# Patient Record
Sex: MALE | Race: WHITE | Employment: OTHER | URBAN - METROPOLITAN AREA
[De-identification: names, ages, dates, MRNs, and addresses within clinical notes are randomized per-mention and may not be internally consistent; named-entity substitution may affect disease eponyms.]

---

## 2020-09-27 ENCOUNTER — APPOINTMENT (OUTPATIENT)
Dept: GENERAL RADIOLOGY | Age: 70
End: 2020-09-27
Payer: MEDICARE

## 2020-09-27 ENCOUNTER — HOSPITAL ENCOUNTER (EMERGENCY)
Age: 70
Discharge: HOME | End: 2020-09-27
Attending: EMERGENCY MEDICINE
Payer: MEDICARE

## 2020-09-27 VITALS
DIASTOLIC BLOOD PRESSURE: 89 MMHG | HEART RATE: 86 BPM | OXYGEN SATURATION: 98 % | WEIGHT: 145 LBS | BODY MASS INDEX: 21.98 KG/M2 | TEMPERATURE: 97.8 F | RESPIRATION RATE: 16 BRPM | HEIGHT: 68 IN | SYSTOLIC BLOOD PRESSURE: 125 MMHG

## 2020-09-27 PROCEDURE — 99284 EMERGENCY DEPT VISIT MOD MDM: CPT

## 2020-09-27 PROCEDURE — 99283 EMERGENCY DEPT VISIT LOW MDM: CPT

## 2020-09-27 PROCEDURE — 6370000000 HC RX 637 (ALT 250 FOR IP): Performed by: PHYSICIAN ASSISTANT

## 2020-09-27 PROCEDURE — 71101 X-RAY EXAM UNILAT RIBS/CHEST: CPT

## 2020-09-27 PROCEDURE — 73090 X-RAY EXAM OF FOREARM: CPT

## 2020-09-27 RX ORDER — IBUPROFEN 400 MG/1
600 TABLET ORAL ONCE
Status: COMPLETED | OUTPATIENT
Start: 2020-09-27 | End: 2020-09-27

## 2020-09-27 RX ORDER — DIAPER,BRIEF,INFANT-TODD,DISP
EACH MISCELLANEOUS ONCE
Status: COMPLETED | OUTPATIENT
Start: 2020-09-27 | End: 2020-09-27

## 2020-09-27 RX ADMIN — IBUPROFEN 600 MG: 400 TABLET, FILM COATED ORAL at 20:14

## 2020-09-27 RX ADMIN — BACITRACIN ZINC: 500 OINTMENT TOPICAL at 20:35

## 2020-09-27 NOTE — ED PROVIDER NOTES
forearm. Tenderness:  mild. Swelling: None. Deformity: No.                 ROM: full range of motion. Skin:  no erythema, rash or wounds noted. Neurovascular: Motor deficit: none. Sensory deficit: none. Pulse deficit: none. Capillary refill: normal.  · Lymphatics: No lymphangitis or adenopathy noted. · Neurological:  Oriented. Motor functions intact. Lab / Imaging Results   (All laboratory and radiology results have been personally reviewed by myself)  Labs:  No results found for this visit on 09/27/20. Imaging: All Radiology results interpreted by Radiologist unless otherwise noted. XR RIBS RIGHT INCLUDE CHEST (MIN 3 VIEWS)   Final Result      No evidence for rib fracture or pneumothorax. XR RADIUS ULNA LEFT (2 VIEWS)   Final Result      No acute fracture is identified. ED Course / Medical Decision Making     Medications   ibuprofen (ADVIL;MOTRIN) tablet 600 mg (600 mg Oral Given 9/27/20 2014)   bacitracin zinc ointment ( Topical Given 9/27/20 2035)        Consult:   None    Procedure(s):   none    MDM:   Patient presenting after falling through his porch. Patient is in no acute distress, afebrile, nontoxic in appearance. Patient's x-rays are normal.  Patient feeling slightly better after Motrin. Discussed supportive care with patient. Recommended patient follow-up with PCP. Recommended patient return to the ED with new or worsening of symptoms. Counseling: The emergency provider has spoken with the patient and discussed todays results, in addition to providing specific details for the plan of care and counseling regarding the diagnosis and prognosis. Questions are answered at this time and they are agreeable with the plan. Assessment      1. Rib contusion, right, initial encounter    2. Abrasion of left forearm, initial encounter    3. Abrasion of left chest wall, initial encounter      Plan   Discharge to home  Patient condition is stable    New Medications     New Prescriptions    No medications on file     Electronically signed by Diana Perez PA-C   DD: 9/27/20  **This report was transcribed using voice recognition software. Every effort was made to ensure accuracy; however, inadvertent computerized transcription errors may be present.   END OF ED PROVIDER NOTE     Diana Perez PA-C  09/27/20 1885

## 2021-03-13 ENCOUNTER — APPOINTMENT (OUTPATIENT)
Dept: GENERAL RADIOLOGY | Age: 71
End: 2021-03-13
Payer: MEDICARE

## 2021-03-13 ENCOUNTER — APPOINTMENT (OUTPATIENT)
Dept: CT IMAGING | Age: 71
End: 2021-03-13
Payer: MEDICARE

## 2021-03-13 ENCOUNTER — HOSPITAL ENCOUNTER (EMERGENCY)
Age: 71
Discharge: ANOTHER HOSPITAL | End: 2021-03-14
Attending: FAMILY MEDICINE
Payer: MEDICARE

## 2021-03-13 VITALS
OXYGEN SATURATION: 93 % | SYSTOLIC BLOOD PRESSURE: 119 MMHG | DIASTOLIC BLOOD PRESSURE: 86 MMHG | RESPIRATION RATE: 24 BRPM | BODY MASS INDEX: 24.02 KG/M2 | HEART RATE: 91 BPM | TEMPERATURE: 100.5 F | WEIGHT: 158 LBS

## 2021-03-13 DIAGNOSIS — R53.1 GENERALIZED WEAKNESS: ICD-10-CM

## 2021-03-13 DIAGNOSIS — Z87.19 HISTORY OF CIRRHOSIS OF LIVER: ICD-10-CM

## 2021-03-13 DIAGNOSIS — R41.82 ALTERED MENTAL STATUS, UNSPECIFIED ALTERED MENTAL STATUS TYPE: Primary | ICD-10-CM

## 2021-03-13 DIAGNOSIS — R19.5 GUAIAC POSITIVE STOOLS: ICD-10-CM

## 2021-03-13 LAB
ALBUMIN SERPL-MCNC: 3.2 G/DL (ref 3.5–5.2)
ALP BLD-CCNC: 102 U/L (ref 40–129)
ALT SERPL-CCNC: 16 U/L (ref 0–40)
AMMONIA: 30 UMOL/L (ref 16–60)
AMYLASE: 36 U/L (ref 20–100)
ANION GAP SERPL CALCULATED.3IONS-SCNC: 10 MMOL/L (ref 7–16)
ANISOCYTOSIS: ABNORMAL
AST SERPL-CCNC: 42 U/L (ref 0–39)
BASOPHILS ABSOLUTE: 0.05 E9/L (ref 0–0.2)
BASOPHILS RELATIVE PERCENT: 0.5 % (ref 0–2)
BILIRUB SERPL-MCNC: 1.9 MG/DL (ref 0–1.2)
BUN BLDV-MCNC: 21 MG/DL (ref 8–23)
CALCIUM SERPL-MCNC: 9.1 MG/DL (ref 8.6–10.2)
CHLORIDE BLD-SCNC: 115 MMOL/L (ref 98–107)
CO2: 21 MMOL/L (ref 22–29)
CREAT SERPL-MCNC: 0.9 MG/DL (ref 0.7–1.2)
EOSINOPHILS ABSOLUTE: 0.37 E9/L (ref 0.05–0.5)
EOSINOPHILS RELATIVE PERCENT: 4 % (ref 0–6)
GFR AFRICAN AMERICAN: >60
GFR NON-AFRICAN AMERICAN: >60 ML/MIN/1.73
GLUCOSE BLD-MCNC: 111 MG/DL (ref 74–99)
HCT VFR BLD CALC: 25.6 % (ref 37–54)
HEMOGLOBIN: 7.6 G/DL (ref 12.5–16.5)
HYPOCHROMIA: ABNORMAL
IMMATURE GRANULOCYTES #: 0.05 E9/L
IMMATURE GRANULOCYTES %: 0.5 % (ref 0–5)
LACTIC ACID: 2.1 MMOL/L (ref 0.5–2.2)
LIPASE: 41 U/L (ref 13–60)
LYMPHOCYTES ABSOLUTE: 1.36 E9/L (ref 1.5–4)
LYMPHOCYTES RELATIVE PERCENT: 14.7 % (ref 20–42)
MCH RBC QN AUTO: 29.2 PG (ref 26–35)
MCHC RBC AUTO-ENTMCNC: 29.7 % (ref 32–34.5)
MCV RBC AUTO: 98.5 FL (ref 80–99.9)
METER GLUCOSE: 102 MG/DL (ref 74–99)
MONOCYTES ABSOLUTE: 0.98 E9/L (ref 0.1–0.95)
MONOCYTES RELATIVE PERCENT: 10.6 % (ref 2–12)
NEUTROPHILS ABSOLUTE: 6.47 E9/L (ref 1.8–7.3)
NEUTROPHILS RELATIVE PERCENT: 69.7 % (ref 43–80)
PDW BLD-RTO: 20.8 FL (ref 11.5–15)
PLATELET # BLD: 107 E9/L (ref 130–450)
PMV BLD AUTO: 13 FL (ref 7–12)
POLYCHROMASIA: ABNORMAL
POTASSIUM REFLEX MAGNESIUM: 4.1 MMOL/L (ref 3.5–5)
RBC # BLD: 2.6 E12/L (ref 3.8–5.8)
SODIUM BLD-SCNC: 146 MMOL/L (ref 132–146)
TOTAL PROTEIN: 7.1 G/DL (ref 6.4–8.3)
WBC # BLD: 9.3 E9/L (ref 4.5–11.5)

## 2021-03-13 PROCEDURE — 85025 COMPLETE CBC W/AUTO DIFF WBC: CPT

## 2021-03-13 PROCEDURE — U0003 INFECTIOUS AGENT DETECTION BY NUCLEIC ACID (DNA OR RNA); SEVERE ACUTE RESPIRATORY SYNDROME CORONAVIRUS 2 (SARS-COV-2) (CORONAVIRUS DISEASE [COVID-19]), AMPLIFIED PROBE TECHNIQUE, MAKING USE OF HIGH THROUGHPUT TECHNOLOGIES AS DESCRIBED BY CMS-2020-01-R: HCPCS

## 2021-03-13 PROCEDURE — 83690 ASSAY OF LIPASE: CPT

## 2021-03-13 PROCEDURE — 82150 ASSAY OF AMYLASE: CPT

## 2021-03-13 PROCEDURE — 36415 COLL VENOUS BLD VENIPUNCTURE: CPT

## 2021-03-13 PROCEDURE — 99285 EMERGENCY DEPT VISIT HI MDM: CPT

## 2021-03-13 PROCEDURE — 83605 ASSAY OF LACTIC ACID: CPT

## 2021-03-13 PROCEDURE — 71045 X-RAY EXAM CHEST 1 VIEW: CPT

## 2021-03-13 PROCEDURE — 80053 COMPREHEN METABOLIC PANEL: CPT

## 2021-03-13 PROCEDURE — 82140 ASSAY OF AMMONIA: CPT

## 2021-03-13 PROCEDURE — 6360000004 HC RX CONTRAST MEDICATION: Performed by: RADIOLOGY

## 2021-03-13 PROCEDURE — 70450 CT HEAD/BRAIN W/O DYE: CPT

## 2021-03-13 PROCEDURE — 82962 GLUCOSE BLOOD TEST: CPT

## 2021-03-13 PROCEDURE — 93005 ELECTROCARDIOGRAM TRACING: CPT | Performed by: FAMILY MEDICINE

## 2021-03-13 PROCEDURE — 74177 CT ABD & PELVIS W/CONTRAST: CPT

## 2021-03-13 RX ORDER — DOCUSATE SODIUM 100 MG/1
100 CAPSULE, LIQUID FILLED ORAL 2 TIMES DAILY
COMMUNITY
End: 2021-03-13

## 2021-03-13 RX ORDER — PANTOPRAZOLE SODIUM 40 MG/1
40 TABLET, DELAYED RELEASE ORAL 2 TIMES DAILY
COMMUNITY
End: 2021-03-13

## 2021-03-13 RX ORDER — CIMETIDINE 400 MG/1
400 TABLET, FILM COATED ORAL 2 TIMES DAILY
COMMUNITY
End: 2021-03-13

## 2021-03-13 RX ORDER — DOXEPIN HYDROCHLORIDE 10 MG/1
10 CAPSULE ORAL NIGHTLY
COMMUNITY

## 2021-03-13 RX ORDER — POLYETHYLENE GLYCOL 3350 17 G/17G
17 POWDER, FOR SOLUTION ORAL DAILY
COMMUNITY
End: 2021-03-13

## 2021-03-13 RX ORDER — FUROSEMIDE 40 MG/1
40 TABLET ORAL DAILY
COMMUNITY
End: 2021-03-13

## 2021-03-13 RX ORDER — OMEPRAZOLE 40 MG/1
40 CAPSULE, DELAYED RELEASE ORAL DAILY
COMMUNITY

## 2021-03-13 RX ORDER — NADOLOL 20 MG/1
20 TABLET ORAL DAILY
Status: ON HOLD | COMMUNITY
End: 2021-09-16 | Stop reason: HOSPADM

## 2021-03-13 RX ORDER — SPIRONOLACTONE 50 MG/1
50 TABLET, FILM COATED ORAL 2 TIMES DAILY
COMMUNITY
End: 2021-03-13

## 2021-03-13 RX ORDER — PREDNISONE 10 MG/1
10 TABLET ORAL DAILY
Status: ON HOLD | COMMUNITY
End: 2021-09-16 | Stop reason: HOSPADM

## 2021-03-13 RX ORDER — AMITRIPTYLINE HYDROCHLORIDE 25 MG/1
25 TABLET, FILM COATED ORAL NIGHTLY
COMMUNITY
End: 2021-03-13

## 2021-03-13 RX ADMIN — IOPAMIDOL 75 ML: 755 INJECTION, SOLUTION INTRAVENOUS at 21:03

## 2021-03-13 NOTE — ED PROVIDER NOTES
HPI:  3/13/21, Time: 6:36 PM TOY Temple is a 69 y.o. male presenting to the ED for a 2-day history of worsening symptoms at that include a weakness of the upper and lower extremities, he cannot get out of bed or walk on his own, he has to be assisted by his wife to get to the bathroom or get into a chair. He also has 2 weeks of loose stools and dark tarry black stools. He has not had any food intake other than drinking liquids over the past 2 days. Also for the past 2 days he is with speech has been slurred. This information was gathered from his wife. She states that the patient has a generalized weakness, not any particular one side of his body. Due to this weakness, he has had several falls over the past 2 days. He understands questions and follows commands but his speech has not been intelligible with his kind of frustrating for him. He has a history of liver cirrhosis was hospitalized 7 months ago and then shortly after that was hospitalized for bleeding ulcer. She states that he when he was hospitalized for cirrhosis, his ammonia level was elevated. He currently does not take any medication to keep his ammonia level regulated. He has a history of alcohol abuse. He has a primary care physician is in the [facility] area and he was last hospitalized at [facility].  He has no medical history and the [facility] system. ROS:   Pertinent positives and negatives are stated within HPI, all other systems reviewed and are negative.  --------------------------------------------- PAST HISTORY ---------------------------------------------  Past Medical History:  has a past medical history of Anemia, Ascites, and Encounter for blood transfusion. Past Surgical History:  has no past surgical history on file. Social History:      Family History: family history is not on file. The patients home medications have been reviewed.     Allergies: Penicillins    ---------------------------------------------------PHYSICAL EXAM--------------------------------------  Constitutional/General: Alert and oriented x3, well appearing, non toxic in NAD  Head: Normocephalic and atraumatic  Eyes: PERRL, EOMI  Mouth: Oropharynx clear, handling secretions, no trismus  Neck: Supple, full ROM, non tender to palpation in the midline, no stridor, no crepitus, no meningeal signs  Pulmonary: Lungs clear to auscultation bilaterally, no wheezes, rales, or rhonchi. Not in respiratory distress  Cardiovascular:  Regular rate. Regular rhythm. No murmurs, gallops, or rubs. 2+ distal pulses  Chest: no chest wall tenderness  Abdomen: Soft. Non tender. Mild distended, bowel sounds hypoactive in all 4 quadrants. There was no rebound tenderness. No hernias or masses organomegaly were palpated. A stool sample was obtained from a bowel movement and it was guaiac positive. Musculoskeletal: Moves all extremities x 4. Warm and well perfused, no clubbing, cyanosis, or edema. Capillary refill <3 seconds  Skin: warm and dry. No rashes. Neurologic: GCS 15, CN 2-12 grossly intact, no focal deficits, symmetric strength 5/5 in the upper and lower extremities bilaterally  Psych: Normal Affect    -------------------------------------------------- RESULTS -------------------------------------------------  I have personally reviewed all laboratory and imaging results for this patient. Results are listed below.      LABS:  Results for orders placed or performed during the hospital encounter of 03/13/21   CBC Auto Differential   Result Value Ref Range    WBC 9.3 4.5 - 11.5 E9/L    RBC 2.60 (L) 3.80 - 5.80 E12/L    Hemoglobin 7.6 (L) 12.5 - 16.5 g/dL    Hematocrit 25.6 (L) 37.0 - 54.0 %    MCV 98.5 80.0 - 99.9 fL    MCH 29.2 26.0 - 35.0 pg    MCHC 29.7 (L) 32.0 - 34.5 %    RDW 20.8 (H) 11.5 - 15.0 fL    Platelets 344 (L) 884 - 450 E9/L    MPV 13.0 (H) 7.0 - 12.0 fL    Neutrophils % 69.7 43.0 - 80.0 % Immature Granulocytes % 0.5 0.0 - 5.0 %    Lymphocytes % 14.7 (L) 20.0 - 42.0 %    Monocytes % 10.6 2.0 - 12.0 %    Eosinophils % 4.0 0.0 - 6.0 %    Basophils % 0.5 0.0 - 2.0 %    Neutrophils Absolute 6.47 1.80 - 7.30 E9/L    Immature Granulocytes # 0.05 E9/L    Lymphocytes Absolute 1.36 (L) 1.50 - 4.00 E9/L    Monocytes Absolute 0.98 (H) 0.10 - 0.95 E9/L    Eosinophils Absolute 0.37 0.05 - 0.50 E9/L    Basophils Absolute 0.05 0.00 - 0.20 E9/L    Anisocytosis 2+     Polychromasia 1+     Hypochromia 1+    Comprehensive Metabolic Panel w/ Reflex to MG   Result Value Ref Range    Sodium 146 132 - 146 mmol/L    Potassium reflex Magnesium 4.1 3.5 - 5.0 mmol/L    Chloride 115 (H) 98 - 107 mmol/L    CO2 21 (L) 22 - 29 mmol/L    Anion Gap 10 7 - 16 mmol/L    Glucose 111 (H) 74 - 99 mg/dL    BUN 21 8 - 23 mg/dL    CREATININE 0.9 0.7 - 1.2 mg/dL    GFR Non-African American >60 >=60 mL/min/1.73    GFR African American >60     Calcium 9.1 8.6 - 10.2 mg/dL    Total Protein 7.1 6.4 - 8.3 g/dL    Albumin 3.2 (L) 3.5 - 5.2 g/dL    Total Bilirubin 1.9 (H) 0.0 - 1.2 mg/dL    Alkaline Phosphatase 102 40 - 129 U/L    ALT 16 0 - 40 U/L    AST 42 (H) 0 - 39 U/L   Lipase   Result Value Ref Range    Lipase 41 13 - 60 U/L   Amylase   Result Value Ref Range    Amylase 36 20 - 100 U/L   Lactic Acid, Plasma   Result Value Ref Range    Lactic Acid 2.1 0.5 - 2.2 mmol/L   Ammonia   Result Value Ref Range    Ammonia 30.0 16.0 - 60.0 umol/L   POCT Glucose   Result Value Ref Range    Meter Glucose 102 (H) 74 - 99 mg/dL       RADIOLOGY:  Interpreted by Radiologist.  CT ABDOMEN PELVIS W IV CONTRAST Additional Contrast? None   Final Result   1. Heterogeneous appearance of the liver suggestive of chronic   hepatocellular disease. Clinical correlation recommended. 2.  Small perihepatic ascites. 3.  Diverticulosis. 4.  Numerous tiny gallstones or hyperattenuating sludge layers in the   gallbladder.       5.  Cystic lesion associated with right kidney likely represents a renal   cyst.  Ultrasound could be helpful for further characterization. XR CHEST PORTABLE   Final Result   No acute process. Mild cardiomegaly. CT Head WO Contrast   Final Result   No acute intracranial abnormality. EKG Interpretation  Interpreted by emergency department physician    Rhythm: sinus tachycardia  Rate: 111  Axis: left  Conduction:   ST Segments: nonspecific changes  T Waves: no acute change    Clinical Impression: Sinus tachycardia with PVCs  Comparison to prior EKG: no previous EKG and None      ------------------------- NURSING NOTES AND VITALS REVIEWED ---------------------------   The nursing notes within the ED encounter and vital signs as below have been reviewed by myself. BP (!) 140/68   Pulse 97   Temp 100.5 °F (38.1 °C)   Resp 30   Wt 158 lb (71.7 kg)   SpO2 93%   BMI 24.02 kg/m²   Oxygen Saturation Interpretation: Normal    The patients available past medical records and past encounters were reviewed. ------------------------------ ED COURSE/MEDICAL DECISION MAKING----------------------  Medications   iopamidol (ISOVUE-370) 76 % injection 75 mL (75 mLs Intravenous Given 3/13/21 2103)             Medical Decision Making:    Patient's work-up included a CT scan of the head that was negative for acute findings. His chest x-ray showed no acute findings. Laboratory values showed a normal white blood cell count, H&H was normal and electrolytes were normal except for an elevated chloride. BUN and creatinine were normal.  LFTs showed an elevated total bilirubin of 1.9, albumin low at 3.2 and AST was elevated at 42. An ammonia level was pending at the time of dictation. This is a laboratory value that is sent out to the main lab at [facility]. Consultations:              At 9:04 PM, I discussed the patient with the transfer line at [facility].  They accepted the patient for admission. Counseling: The emergency provider has spoken with the patient and discussed todays results, in addition to providing specific details for the plan of care and counseling regarding the diagnosis and prognosis. Questions are answered at this time and they are agreeable with the plan.       --------------------------------- IMPRESSION AND DISPOSITION ---------------------------------    IMPRESSION  1. Altered mental status, unspecified altered mental status type    2. Generalized weakness    3. History of cirrhosis of liver    4. Guaiac positive stools        DISPOSITION  Disposition: Transfer to [facility]  Patient condition is fair        NOTE: This report was transcribed using voice recognition software.  Every effort was made to ensure accuracy; however, inadvertent computerized transcription errors may be present         Juliana Larsen MD  03/13/21 2950

## 2021-03-13 NOTE — ED NOTES
Bed: 11  Expected date:   Expected time:   Means of arrival:   Comments:  Safia Rodas, RN  03/13/21 6322

## 2021-03-13 NOTE — ED NOTES
GRIS Meier doesn't feel it is safe to do swallow screen at this time     Mihai Leonard RN  03/13/21 6405

## 2021-03-14 LAB
EKG ATRIAL RATE: 111 BPM
EKG P AXIS: 1 DEGREES
EKG P-R INTERVAL: 144 MS
EKG Q-T INTERVAL: 354 MS
EKG QRS DURATION: 100 MS
EKG QTC CALCULATION (BAZETT): 481 MS
EKG R AXIS: -35 DEGREES
EKG T AXIS: 65 DEGREES
EKG VENTRICULAR RATE: 111 BPM

## 2021-03-14 PROCEDURE — 93010 ELECTROCARDIOGRAM REPORT: CPT | Performed by: INTERNAL MEDICINE

## 2021-03-14 NOTE — ED NOTES
Report to LTAC, located within [facility] at [facility], RN  03/14/21 4296

## 2021-03-14 NOTE — ED NOTES
Clean linen and gown/diaper.   Washed pt's perineal area  Guaiac positive on stool sample     Abril Levine RN  03/13/21 6436

## 2021-03-15 LAB — SARS-COV-2, PCR: NOT DETECTED

## 2021-09-14 ENCOUNTER — ANESTHESIA (OUTPATIENT)
Dept: ENDOSCOPY | Age: 71
DRG: 432 | End: 2021-09-14
Payer: MEDICARE

## 2021-09-14 ENCOUNTER — ANESTHESIA EVENT (OUTPATIENT)
Dept: ENDOSCOPY | Age: 71
DRG: 432 | End: 2021-09-14
Payer: MEDICARE

## 2021-09-14 ENCOUNTER — APPOINTMENT (OUTPATIENT)
Dept: GENERAL RADIOLOGY | Age: 71
DRG: 432 | End: 2021-09-14
Payer: MEDICARE

## 2021-09-14 ENCOUNTER — APPOINTMENT (OUTPATIENT)
Dept: CT IMAGING | Age: 71
DRG: 432 | End: 2021-09-14
Payer: MEDICARE

## 2021-09-14 ENCOUNTER — HOSPITAL ENCOUNTER (INPATIENT)
Age: 71
LOS: 2 days | Discharge: HOME | DRG: 432 | End: 2021-09-16
Attending: EMERGENCY MEDICINE | Admitting: FAMILY MEDICINE
Payer: MEDICARE

## 2021-09-14 VITALS — DIASTOLIC BLOOD PRESSURE: 61 MMHG | SYSTOLIC BLOOD PRESSURE: 127 MMHG | OXYGEN SATURATION: 100 %

## 2021-09-14 DIAGNOSIS — K74.60 HEPATIC CIRRHOSIS, UNSPECIFIED HEPATIC CIRRHOSIS TYPE, UNSPECIFIED WHETHER ASCITES PRESENT (HCC): ICD-10-CM

## 2021-09-14 DIAGNOSIS — K92.2 GASTROINTESTINAL HEMORRHAGE, UNSPECIFIED GASTROINTESTINAL HEMORRHAGE TYPE: Primary | ICD-10-CM

## 2021-09-14 LAB
ABO/RH: NORMAL
ACETAMINOPHEN LEVEL: <5 MCG/ML (ref 10–30)
ALBUMIN SERPL-MCNC: 2.2 G/DL (ref 3.5–5.2)
ALP BLD-CCNC: 127 U/L (ref 40–129)
ALT SERPL-CCNC: 15 U/L (ref 0–40)
AMMONIA: 29 UMOL/L (ref 16–60)
ANGLE (CLOT STRENGTH): 76.5 DEGREE (ref 59–74)
ANION GAP SERPL CALCULATED.3IONS-SCNC: 13 MMOL/L (ref 7–16)
ANISOCYTOSIS: ABNORMAL
ANTIBODY SCREEN: NORMAL
AST SERPL-CCNC: 41 U/L (ref 0–39)
BASOPHILS ABSOLUTE: 0.15 E9/L (ref 0–0.2)
BASOPHILS RELATIVE PERCENT: 0.9 % (ref 0–2)
BILIRUB SERPL-MCNC: 1 MG/DL (ref 0–1.2)
BILIRUBIN URINE: NEGATIVE
BLOOD BANK DISPENSE STATUS: NORMAL
BLOOD BANK PRODUCT CODE: NORMAL
BLOOD, URINE: NEGATIVE
BPU ID: NORMAL
BUN BLDV-MCNC: 27 MG/DL (ref 6–23)
BURR CELLS: ABNORMAL
CALCIUM IONIZED: 1.05 MMOL/L (ref 1.15–1.33)
CALCIUM SERPL-MCNC: 7.6 MG/DL (ref 8.6–10.2)
CHLORIDE BLD-SCNC: 96 MMOL/L (ref 98–107)
CLARITY: CLEAR
CO2: 19 MMOL/L (ref 22–29)
COLOR: YELLOW
CREAT SERPL-MCNC: 0.9 MG/DL (ref 0.7–1.2)
DESCRIPTION BLOOD BANK: NORMAL
EKG ATRIAL RATE: 126 BPM
EKG P AXIS: 67 DEGREES
EKG P-R INTERVAL: 134 MS
EKG Q-T INTERVAL: 348 MS
EKG QRS DURATION: 98 MS
EKG QTC CALCULATION (BAZETT): 504 MS
EKG R AXIS: -31 DEGREES
EKG T AXIS: 24 DEGREES
EKG VENTRICULAR RATE: 126 BPM
EOSINOPHILS ABSOLUTE: 0 E9/L (ref 0.05–0.5)
EOSINOPHILS RELATIVE PERCENT: 1.2 % (ref 0–6)
EPL-TEG: 0 % (ref 0–15)
ETHANOL: <10 MG/DL (ref 0–0.08)
G-TEG: 12.4 K D/SC (ref 4.5–11)
GFR AFRICAN AMERICAN: >60
GFR NON-AFRICAN AMERICAN: >60 ML/MIN/1.73
GLUCOSE BLD-MCNC: 118 MG/DL (ref 74–99)
GLUCOSE URINE: NEGATIVE MG/DL
HCT VFR BLD CALC: 19.8 % (ref 37–54)
HCT VFR BLD CALC: 22.3 % (ref 37–54)
HCT VFR BLD CALC: 22.7 % (ref 37–54)
HEMOGLOBIN: 5.4 G/DL (ref 12.5–16.5)
HEMOGLOBIN: 6.9 G/DL (ref 12.5–16.5)
HEMOGLOBIN: 7 G/DL (ref 12.5–16.5)
HYPOCHROMIA: ABNORMAL
INR BLD: 2.3
K (CLOTTING TIME): 0.8 MIN (ref 1–3)
KETONES, URINE: NEGATIVE MG/DL
LACTIC ACID: 1.6 MMOL/L (ref 0.5–2.2)
LACTIC ACID: 4.8 MMOL/L (ref 0.5–2.2)
LEUKOCYTE ESTERASE, URINE: NEGATIVE
LIPASE: 38 U/L (ref 13–60)
LY30 (FIBRINOLYSIS): 0 % (ref 0–8)
LYMPHOCYTES ABSOLUTE: 0.66 E9/L (ref 1.5–4)
LYMPHOCYTES RELATIVE PERCENT: 3.5 % (ref 20–42)
MA (MAX AMPLITUDE): 71.3 MM (ref 50–70)
MAGNESIUM: 1.5 MG/DL (ref 1.6–2.6)
MCH RBC QN AUTO: 21.6 PG (ref 26–35)
MCHC RBC AUTO-ENTMCNC: 27.3 % (ref 32–34.5)
MCV RBC AUTO: 79.2 FL (ref 80–99.9)
MONOCYTES ABSOLUTE: 0.66 E9/L (ref 0.1–0.95)
MONOCYTES RELATIVE PERCENT: 4.3 % (ref 2–12)
NEUTROPHILS ABSOLUTE: 15.02 E9/L (ref 1.8–7.3)
NEUTROPHILS RELATIVE PERCENT: 91.3 % (ref 43–80)
NITRITE, URINE: NEGATIVE
OVALOCYTES: ABNORMAL
PDW BLD-RTO: 20.4 FL (ref 11.5–15)
PH UA: 6 (ref 5–9)
PHOSPHORUS: 2.6 MG/DL (ref 2.5–4.5)
PLATELET # BLD: 303 E9/L (ref 130–450)
PMV BLD AUTO: 11.1 FL (ref 7–12)
POIKILOCYTES: ABNORMAL
POLYCHROMASIA: ABNORMAL
POTASSIUM SERPL-SCNC: 3.4 MMOL/L (ref 3.5–5)
PROTEIN UA: NEGATIVE MG/DL
PROTHROMBIN TIME: 25.5 SEC (ref 9.3–12.4)
R (REACTION TIME): 2 MIN (ref 5–10)
RBC # BLD: 2.5 E12/L (ref 3.8–5.8)
SALICYLATE, SERUM: 1.4 MG/DL (ref 0–30)
SARS-COV-2, NAAT: NOT DETECTED
SODIUM BLD-SCNC: 128 MMOL/L (ref 132–146)
SPECIFIC GRAVITY UA: 1.01 (ref 1–1.03)
TARGET CELLS: ABNORMAL
TOTAL PROTEIN: 5.3 G/DL (ref 6.4–8.3)
TRICYCLIC ANTIDEPRESSANTS SCREEN SERUM: NEGATIVE NG/ML
TROPONIN, HIGH SENSITIVITY: 18 NG/L (ref 0–11)
UROBILINOGEN, URINE: 0.2 E.U./DL
WBC # BLD: 16.5 E9/L (ref 4.5–11.5)

## 2021-09-14 PROCEDURE — 86900 BLOOD TYPING SEROLOGIC ABO: CPT

## 2021-09-14 PROCEDURE — 81003 URINALYSIS AUTO W/O SCOPE: CPT

## 2021-09-14 PROCEDURE — 83690 ASSAY OF LIPASE: CPT

## 2021-09-14 PROCEDURE — 85576 BLOOD PLATELET AGGREGATION: CPT

## 2021-09-14 PROCEDURE — 72125 CT NECK SPINE W/O DYE: CPT

## 2021-09-14 PROCEDURE — 6360000002 HC RX W HCPCS: Performed by: INTERNAL MEDICINE

## 2021-09-14 PROCEDURE — 70450 CT HEAD/BRAIN W/O DYE: CPT

## 2021-09-14 PROCEDURE — 2580000003 HC RX 258: Performed by: STUDENT IN AN ORGANIZED HEALTH CARE EDUCATION/TRAINING PROGRAM

## 2021-09-14 PROCEDURE — 85025 COMPLETE CBC W/AUTO DIFF WBC: CPT

## 2021-09-14 PROCEDURE — 36430 TRANSFUSION BLD/BLD COMPNT: CPT

## 2021-09-14 PROCEDURE — C9113 INJ PANTOPRAZOLE SODIUM, VIA: HCPCS | Performed by: STUDENT IN AN ORGANIZED HEALTH CARE EDUCATION/TRAINING PROGRAM

## 2021-09-14 PROCEDURE — 2000000000 HC ICU R&B

## 2021-09-14 PROCEDURE — 85347 COAGULATION TIME ACTIVATED: CPT

## 2021-09-14 PROCEDURE — P9016 RBC LEUKOCYTES REDUCED: HCPCS

## 2021-09-14 PROCEDURE — 6360000002 HC RX W HCPCS: Performed by: FAMILY MEDICINE

## 2021-09-14 PROCEDURE — 99285 EMERGENCY DEPT VISIT HI MDM: CPT

## 2021-09-14 PROCEDURE — 3700000000 HC ANESTHESIA ATTENDED CARE: Performed by: INTERNAL MEDICINE

## 2021-09-14 PROCEDURE — 36556 INSERT NON-TUNNEL CV CATH: CPT

## 2021-09-14 PROCEDURE — 85018 HEMOGLOBIN: CPT

## 2021-09-14 PROCEDURE — 2709999900 HC NON-CHARGEABLE SUPPLY: Performed by: INTERNAL MEDICINE

## 2021-09-14 PROCEDURE — 82077 ASSAY SPEC XCP UR&BREATH IA: CPT

## 2021-09-14 PROCEDURE — 6360000002 HC RX W HCPCS: Performed by: STUDENT IN AN ORGANIZED HEALTH CARE EDUCATION/TRAINING PROGRAM

## 2021-09-14 PROCEDURE — 80048 BASIC METABOLIC PNL TOTAL CA: CPT

## 2021-09-14 PROCEDURE — 2580000003 HC RX 258: Performed by: FAMILY MEDICINE

## 2021-09-14 PROCEDURE — 6360000002 HC RX W HCPCS: Performed by: EMERGENCY MEDICINE

## 2021-09-14 PROCEDURE — 80143 DRUG ASSAY ACETAMINOPHEN: CPT

## 2021-09-14 PROCEDURE — 85384 FIBRINOGEN ACTIVITY: CPT

## 2021-09-14 PROCEDURE — 74176 CT ABD & PELVIS W/O CONTRAST: CPT

## 2021-09-14 PROCEDURE — 71045 X-RAY EXAM CHEST 1 VIEW: CPT

## 2021-09-14 PROCEDURE — 2500000003 HC RX 250 WO HCPCS: Performed by: NURSE ANESTHETIST, CERTIFIED REGISTERED

## 2021-09-14 PROCEDURE — 80307 DRUG TEST PRSMV CHEM ANLYZR: CPT

## 2021-09-14 PROCEDURE — 02HV33Z INSERTION OF INFUSION DEVICE INTO SUPERIOR VENA CAVA, PERCUTANEOUS APPROACH: ICD-10-PCS | Performed by: STUDENT IN AN ORGANIZED HEALTH CARE EDUCATION/TRAINING PROGRAM

## 2021-09-14 PROCEDURE — 3609013000 HC EGD TRANSORAL CONTROL BLEEDING ANY METHOD: Performed by: INTERNAL MEDICINE

## 2021-09-14 PROCEDURE — 93005 ELECTROCARDIOGRAM TRACING: CPT | Performed by: EMERGENCY MEDICINE

## 2021-09-14 PROCEDURE — 86850 RBC ANTIBODY SCREEN: CPT

## 2021-09-14 PROCEDURE — 83605 ASSAY OF LACTIC ACID: CPT

## 2021-09-14 PROCEDURE — 83735 ASSAY OF MAGNESIUM: CPT

## 2021-09-14 PROCEDURE — 84484 ASSAY OF TROPONIN QUANT: CPT

## 2021-09-14 PROCEDURE — 3609017100 HC EGD: Performed by: INTERNAL MEDICINE

## 2021-09-14 PROCEDURE — 36415 COLL VENOUS BLD VENIPUNCTURE: CPT

## 2021-09-14 PROCEDURE — 96374 THER/PROPH/DIAG INJ IV PUSH: CPT

## 2021-09-14 PROCEDURE — 36592 COLLECT BLOOD FROM PICC: CPT

## 2021-09-14 PROCEDURE — 36556 INSERT NON-TUNNEL CV CATH: CPT | Performed by: SURGERY

## 2021-09-14 PROCEDURE — 3700000001 HC ADD 15 MINUTES (ANESTHESIA): Performed by: INTERNAL MEDICINE

## 2021-09-14 PROCEDURE — 96361 HYDRATE IV INFUSION ADD-ON: CPT

## 2021-09-14 PROCEDURE — 86901 BLOOD TYPING SEROLOGIC RH(D): CPT

## 2021-09-14 PROCEDURE — 6360000002 HC RX W HCPCS: Performed by: NURSE ANESTHETIST, CERTIFIED REGISTERED

## 2021-09-14 PROCEDURE — 2500000003 HC RX 250 WO HCPCS: Performed by: SURGERY

## 2021-09-14 PROCEDURE — 80179 DRUG ASSAY SALICYLATE: CPT

## 2021-09-14 PROCEDURE — 06L38CZ OCCLUSION OF ESOPHAGEAL VEIN WITH EXTRALUMINAL DEVICE, VIA NATURAL OR ARTIFICIAL OPENING ENDOSCOPIC: ICD-10-PCS | Performed by: INTERNAL MEDICINE

## 2021-09-14 PROCEDURE — 6360000002 HC RX W HCPCS: Performed by: SURGERY

## 2021-09-14 PROCEDURE — 82330 ASSAY OF CALCIUM: CPT

## 2021-09-14 PROCEDURE — C9113 INJ PANTOPRAZOLE SODIUM, VIA: HCPCS | Performed by: EMERGENCY MEDICINE

## 2021-09-14 PROCEDURE — 85014 HEMATOCRIT: CPT

## 2021-09-14 PROCEDURE — 85610 PROTHROMBIN TIME: CPT

## 2021-09-14 PROCEDURE — 86923 COMPATIBILITY TEST ELECTRIC: CPT

## 2021-09-14 PROCEDURE — 82140 ASSAY OF AMMONIA: CPT

## 2021-09-14 PROCEDURE — 99291 CRITICAL CARE FIRST HOUR: CPT | Performed by: SURGERY

## 2021-09-14 PROCEDURE — 80053 COMPREHEN METABOLIC PANEL: CPT

## 2021-09-14 PROCEDURE — 84100 ASSAY OF PHOSPHORUS: CPT

## 2021-09-14 PROCEDURE — 2720000010 HC SURG SUPPLY STERILE: Performed by: INTERNAL MEDICINE

## 2021-09-14 PROCEDURE — 93010 ELECTROCARDIOGRAM REPORT: CPT | Performed by: INTERNAL MEDICINE

## 2021-09-14 PROCEDURE — 6370000000 HC RX 637 (ALT 250 FOR IP)

## 2021-09-14 PROCEDURE — 2580000003 HC RX 258: Performed by: SURGERY

## 2021-09-14 PROCEDURE — 87081 CULTURE SCREEN ONLY: CPT

## 2021-09-14 PROCEDURE — 96376 TX/PRO/DX INJ SAME DRUG ADON: CPT

## 2021-09-14 PROCEDURE — 96375 TX/PRO/DX INJ NEW DRUG ADDON: CPT

## 2021-09-14 PROCEDURE — 87635 SARS-COV-2 COVID-19 AMP PRB: CPT

## 2021-09-14 RX ORDER — DIMETHICONE, OXYBENZONE, AND PADIMATE O 2; 2.5; 6.6 G/100G; G/100G; G/100G
STICK TOPICAL
Status: COMPLETED
Start: 2021-09-14 | End: 2021-09-14

## 2021-09-14 RX ORDER — ACETAMINOPHEN 325 MG/1
650 TABLET ORAL EVERY 6 HOURS PRN
Status: DISCONTINUED | OUTPATIENT
Start: 2021-09-14 | End: 2021-09-16 | Stop reason: HOSPADM

## 2021-09-14 RX ORDER — 0.9 % SODIUM CHLORIDE 0.9 %
1000 INTRAVENOUS SOLUTION INTRAVENOUS ONCE
Status: COMPLETED | OUTPATIENT
Start: 2021-09-14 | End: 2021-09-14

## 2021-09-14 RX ORDER — PANTOPRAZOLE SODIUM 40 MG/10ML
40 INJECTION, POWDER, LYOPHILIZED, FOR SOLUTION INTRAVENOUS ONCE
Status: COMPLETED | OUTPATIENT
Start: 2021-09-14 | End: 2021-09-14

## 2021-09-14 RX ORDER — SODIUM CHLORIDE, SODIUM LACTATE, POTASSIUM CHLORIDE, CALCIUM CHLORIDE 600; 310; 30; 20 MG/100ML; MG/100ML; MG/100ML; MG/100ML
INJECTION, SOLUTION INTRAVENOUS CONTINUOUS
Status: DISCONTINUED | OUTPATIENT
Start: 2021-09-14 | End: 2021-09-15

## 2021-09-14 RX ORDER — SUCRALFATE 1 G/1
1 TABLET ORAL EVERY 6 HOURS SCHEDULED
Status: DISCONTINUED | OUTPATIENT
Start: 2021-09-14 | End: 2021-09-14

## 2021-09-14 RX ORDER — SODIUM CHLORIDE 0.9 % (FLUSH) 0.9 %
5-40 SYRINGE (ML) INJECTION PRN
Status: DISCONTINUED | OUTPATIENT
Start: 2021-09-14 | End: 2021-09-16 | Stop reason: HOSPADM

## 2021-09-14 RX ORDER — MAGNESIUM SULFATE IN WATER 40 MG/ML
2000 INJECTION, SOLUTION INTRAVENOUS ONCE
Status: COMPLETED | OUTPATIENT
Start: 2021-09-14 | End: 2021-09-14

## 2021-09-14 RX ORDER — OCTREOTIDE ACETATE 50 UG/ML
50 INJECTION, SOLUTION INTRAVENOUS; SUBCUTANEOUS ONCE
Status: COMPLETED | OUTPATIENT
Start: 2021-09-14 | End: 2021-09-14

## 2021-09-14 RX ORDER — ACETAMINOPHEN 650 MG/1
650 SUPPOSITORY RECTAL EVERY 6 HOURS PRN
Status: DISCONTINUED | OUTPATIENT
Start: 2021-09-14 | End: 2021-09-16 | Stop reason: HOSPADM

## 2021-09-14 RX ORDER — MULTIVITAMIN WITH IRON
1 TABLET ORAL DAILY
Status: DISCONTINUED | OUTPATIENT
Start: 2021-09-14 | End: 2021-09-16 | Stop reason: HOSPADM

## 2021-09-14 RX ORDER — FOLIC ACID 1 MG/1
1 TABLET ORAL DAILY
Status: DISCONTINUED | OUTPATIENT
Start: 2021-09-14 | End: 2021-09-16 | Stop reason: HOSPADM

## 2021-09-14 RX ORDER — SODIUM CHLORIDE 9 MG/ML
INJECTION, SOLUTION INTRAVENOUS PRN
Status: DISCONTINUED | OUTPATIENT
Start: 2021-09-14 | End: 2021-09-15

## 2021-09-14 RX ORDER — ONDANSETRON 2 MG/ML
4 INJECTION INTRAMUSCULAR; INTRAVENOUS EVERY 6 HOURS PRN
Status: DISCONTINUED | OUTPATIENT
Start: 2021-09-14 | End: 2021-09-16 | Stop reason: HOSPADM

## 2021-09-14 RX ORDER — EPINEPHRINE 1 MG/ML(1)
AMPUL (ML) INJECTION PRN
Status: DISCONTINUED | OUTPATIENT
Start: 2021-09-14 | End: 2021-09-14 | Stop reason: ALTCHOICE

## 2021-09-14 RX ORDER — ONDANSETRON 2 MG/ML
4 INJECTION INTRAMUSCULAR; INTRAVENOUS ONCE
Status: COMPLETED | OUTPATIENT
Start: 2021-09-14 | End: 2021-09-14

## 2021-09-14 RX ORDER — ONDANSETRON 4 MG/1
4 TABLET, ORALLY DISINTEGRATING ORAL EVERY 8 HOURS PRN
Status: DISCONTINUED | OUTPATIENT
Start: 2021-09-14 | End: 2021-09-16 | Stop reason: HOSPADM

## 2021-09-14 RX ORDER — PROPOFOL 10 MG/ML
INJECTION, EMULSION INTRAVENOUS PRN
Status: DISCONTINUED | OUTPATIENT
Start: 2021-09-14 | End: 2021-09-14 | Stop reason: SDUPTHER

## 2021-09-14 RX ORDER — GAUZE BANDAGE 2" X 2"
100 BANDAGE TOPICAL DAILY
Status: DISCONTINUED | OUTPATIENT
Start: 2021-09-14 | End: 2021-09-16 | Stop reason: HOSPADM

## 2021-09-14 RX ORDER — POTASSIUM CHLORIDE 29.8 MG/ML
20 INJECTION INTRAVENOUS ONCE
Status: COMPLETED | OUTPATIENT
Start: 2021-09-14 | End: 2021-09-14

## 2021-09-14 RX ORDER — SODIUM CHLORIDE 9 MG/ML
10 INJECTION INTRAVENOUS 2 TIMES DAILY
Status: DISCONTINUED | OUTPATIENT
Start: 2021-09-14 | End: 2021-09-16 | Stop reason: HOSPADM

## 2021-09-14 RX ORDER — SODIUM CHLORIDE 9 MG/ML
INJECTION, SOLUTION INTRAVENOUS PRN
Status: COMPLETED | OUTPATIENT
Start: 2021-09-14 | End: 2021-09-14

## 2021-09-14 RX ORDER — CALCIUM CHLORIDE 100 MG/ML
1000 INJECTION INTRAVENOUS; INTRAVENTRICULAR ONCE
Status: DISCONTINUED | OUTPATIENT
Start: 2021-09-14 | End: 2021-09-14 | Stop reason: SDUPTHER

## 2021-09-14 RX ORDER — SODIUM CHLORIDE 0.9 % (FLUSH) 0.9 %
5-40 SYRINGE (ML) INJECTION EVERY 12 HOURS SCHEDULED
Status: DISCONTINUED | OUTPATIENT
Start: 2021-09-14 | End: 2021-09-16 | Stop reason: HOSPADM

## 2021-09-14 RX ORDER — PHENYLEPHRINE HCL IN 0.9% NACL 1 MG/10 ML
SYRINGE (ML) INTRAVENOUS PRN
Status: DISCONTINUED | OUTPATIENT
Start: 2021-09-14 | End: 2021-09-14 | Stop reason: SDUPTHER

## 2021-09-14 RX ORDER — PANTOPRAZOLE SODIUM 40 MG/10ML
40 INJECTION, POWDER, LYOPHILIZED, FOR SOLUTION INTRAVENOUS 2 TIMES DAILY
Status: DISCONTINUED | OUTPATIENT
Start: 2021-09-14 | End: 2021-09-16 | Stop reason: HOSPADM

## 2021-09-14 RX ORDER — SODIUM CHLORIDE 9 MG/ML
25 INJECTION, SOLUTION INTRAVENOUS PRN
Status: DISCONTINUED | OUTPATIENT
Start: 2021-09-14 | End: 2021-09-16 | Stop reason: HOSPADM

## 2021-09-14 RX ADMIN — CEFTRIAXONE 1000 MG: 1 INJECTION, POWDER, FOR SOLUTION INTRAMUSCULAR; INTRAVENOUS at 05:10

## 2021-09-14 RX ADMIN — OCTREOTIDE ACETATE 50 MCG/HR: 500 INJECTION, SOLUTION INTRAVENOUS; SUBCUTANEOUS at 05:50

## 2021-09-14 RX ADMIN — SODIUM CHLORIDE, PRESERVATIVE FREE 10 ML: 5 INJECTION INTRAVENOUS at 20:52

## 2021-09-14 RX ADMIN — PROPOFOL 20 MG: 10 INJECTION, EMULSION INTRAVENOUS at 10:23

## 2021-09-14 RX ADMIN — Medication: at 23:30

## 2021-09-14 RX ADMIN — OCTREOTIDE ACETATE 50 MCG: 50 INJECTION, SOLUTION INTRAVENOUS; SUBCUTANEOUS at 05:19

## 2021-09-14 RX ADMIN — SODIUM CHLORIDE: 9 INJECTION, SOLUTION INTRAVENOUS at 10:16

## 2021-09-14 RX ADMIN — SODIUM CHLORIDE, POTASSIUM CHLORIDE, SODIUM LACTATE AND CALCIUM CHLORIDE: 600; 310; 30; 20 INJECTION, SOLUTION INTRAVENOUS at 21:56

## 2021-09-14 RX ADMIN — SODIUM CHLORIDE, PRESERVATIVE FREE 10 ML: 5 INJECTION INTRAVENOUS at 08:30

## 2021-09-14 RX ADMIN — SODIUM CHLORIDE 1000 ML: 9 INJECTION, SOLUTION INTRAVENOUS at 03:35

## 2021-09-14 RX ADMIN — MAGNESIUM SULFATE HEPTAHYDRATE 2000 MG: 40 INJECTION, SOLUTION INTRAVENOUS at 20:33

## 2021-09-14 RX ADMIN — PANTOPRAZOLE SODIUM 40 MG: 40 INJECTION, POWDER, FOR SOLUTION INTRAVENOUS at 20:52

## 2021-09-14 RX ADMIN — SODIUM CHLORIDE, POTASSIUM CHLORIDE, SODIUM LACTATE AND CALCIUM CHLORIDE: 600; 310; 30; 20 INJECTION, SOLUTION INTRAVENOUS at 14:52

## 2021-09-14 RX ADMIN — SODIUM CHLORIDE: 9 INJECTION, SOLUTION INTRAVENOUS at 05:09

## 2021-09-14 RX ADMIN — OCTREOTIDE ACETATE 50 MCG/HR: 500 INJECTION, SOLUTION INTRAVENOUS; SUBCUTANEOUS at 16:19

## 2021-09-14 RX ADMIN — PANTOPRAZOLE SODIUM 40 MG: 40 INJECTION, POWDER, FOR SOLUTION INTRAVENOUS at 04:45

## 2021-09-14 RX ADMIN — PROPOFOL 50 MG: 10 INJECTION, EMULSION INTRAVENOUS at 10:19

## 2021-09-14 RX ADMIN — Medication 200 MCG: at 10:25

## 2021-09-14 RX ADMIN — PROPOFOL 30 MG: 10 INJECTION, EMULSION INTRAVENOUS at 10:28

## 2021-09-14 RX ADMIN — CALCIUM CHLORIDE INJECTION 1000 MG: 100 INJECTION, SOLUTION INTRAVENOUS at 20:51

## 2021-09-14 RX ADMIN — POTASSIUM CHLORIDE 20 MEQ: 29.8 INJECTION, SOLUTION INTRAVENOUS at 17:24

## 2021-09-14 RX ADMIN — PANTOPRAZOLE SODIUM 40 MG: 40 INJECTION, POWDER, FOR SOLUTION INTRAVENOUS at 03:41

## 2021-09-14 RX ADMIN — ONDANSETRON 4 MG: 2 INJECTION INTRAMUSCULAR; INTRAVENOUS at 03:41

## 2021-09-14 RX ADMIN — CALCIUM GLUCONATE 1000 MG: 98 INJECTION, SOLUTION INTRAVENOUS at 17:23

## 2021-09-14 RX ADMIN — MAGNESIUM SULFATE HEPTAHYDRATE 2000 MG: 40 INJECTION, SOLUTION INTRAVENOUS at 17:23

## 2021-09-14 RX ADMIN — SODIUM CHLORIDE, POTASSIUM CHLORIDE, SODIUM LACTATE AND CALCIUM CHLORIDE: 600; 310; 30; 20 INJECTION, SOLUTION INTRAVENOUS at 22:03

## 2021-09-14 ASSESSMENT — ENCOUNTER SYMPTOMS
COUGH: 0
SHORTNESS OF BREATH: 0
NAUSEA: 1
ABDOMINAL PAIN: 1
VOMITING: 1

## 2021-09-14 ASSESSMENT — PAIN SCALES - WONG BAKER: WONGBAKER_NUMERICALRESPONSE: 4

## 2021-09-14 ASSESSMENT — PAIN SCALES - GENERAL
PAINLEVEL_OUTOF10: 0
PAINLEVEL_OUTOF10: 4

## 2021-09-14 ASSESSMENT — PAIN DESCRIPTION - FREQUENCY
FREQUENCY: CONTINUOUS
FREQUENCY: CONTINUOUS

## 2021-09-14 ASSESSMENT — PAIN DESCRIPTION - PAIN TYPE
TYPE: ACUTE PAIN
TYPE: ACUTE PAIN

## 2021-09-14 ASSESSMENT — PAIN - FUNCTIONAL ASSESSMENT: PAIN_FUNCTIONAL_ASSESSMENT: PREVENTS OR INTERFERES SOME ACTIVE ACTIVITIES AND ADLS

## 2021-09-14 ASSESSMENT — PAIN DESCRIPTION - LOCATION
LOCATION: ABDOMEN
LOCATION: ABDOMEN;BUTTOCKS

## 2021-09-14 ASSESSMENT — PAIN DESCRIPTION - DESCRIPTORS
DESCRIPTORS: CONSTANT
DESCRIPTORS: ACHING;SORE

## 2021-09-14 ASSESSMENT — PAIN DESCRIPTION - ONSET: ONSET: ON-GOING

## 2021-09-14 ASSESSMENT — PAIN DESCRIPTION - PROGRESSION: CLINICAL_PROGRESSION: NOT CHANGED

## 2021-09-14 NOTE — CONSULTS
Surgical Intensive Care Unit  Consult  Note  Date of admission:  9/14/2021    Reason for ICU transfer:  Critical care    Chief Complaint   Patient presents with    Hemoptysis     pt states it started at 230pm. +abdominal pain.  Rectal Bleeding        HPI:     This patient is a 76-year-old male with history of chronic alcohol abuse x2 20 ounce [employer] Hard Lemonade daily, hepatic encephalopathy, alcohol induced cirrhosis, peptic ulcer disease, upper GI bleed, esophageal bleeding varices s/p banding x4 at [facility] in March 2021; of note during that admission at [facility] he was unstable and also aspirated lactulose on day of discharge required longer stay, IV meropenem, supplemental O2. He presented to the ED on 9/14 due to hematemesis ongoing for several hours, stating that he was vomiting dark blood and coffee-ground material; also endorsed abdominal pain and dark black tarry stools. Denies any chest pain, shortness of breath. In the ED patient was hypotensive and tachycardic. Labs significant for lactic acidosis, marked blood loss anemia with hemoglobin 5.4. Octreotide and Protonix gtt initiated, 2 units PRBCs given. GI consult was placed for EGD. Patient was accepted to the SICU for admission. Past Medical History:   Diagnosis Date    Anemia     Ascites     Encounter for blood transfusion       History reviewed. No pertinent surgical history.    Penicillins   Current Facility-Administered Medications   Medication Dose Route Frequency Provider Last Rate Last Admin    octreotide (SANDOSTATIN) 500 mcg in sodium chloride 0.9 % 100 mL infusion  50 mcg/hr IntraVENous Continuous Gerre Renita, DO 10 mL/hr at 09/14/21 0550 50 mcg/hr at 09/14/21 0550    sodium chloride flush 0.9 % injection 5-40 mL  5-40 mL IntraVENous 2 times per day Gerre Renita, DO        sodium chloride flush 0.9 % injection 5-40 mL  5-40 mL IntraVENous PRN Gerre Renita, DO        0.9 % sodium chloride infusion  25 mL IntraVENous PRN Zabrina Garcia Kimberley Zheng DO   Stopped at 09/14/21 1033    ondansetron (ZOFRAN-ODT) disintegrating tablet 4 mg  4 mg Oral Q8H PRN Hanna Giron, DO        Or    [facility] injection 4 mg  4 mg IntraVENous Q6H PRN Hanna Giron, DO        acetaminophen (TYLENOL) tablet 650 mg  650 mg Oral Q6H PRN Hanna Giron, DO        Or    acetaminophen (TYLENOL) suppository 650 mg  650 mg Rectal Q6H PRN Hanna Giron, DO        lactated ringers infusion   IntraVENous Continuous Nelda Andrea  mL/hr at 09/14/21 1452 New Bag at 09/14/21 1452    pantoprazole (PROTONIX) injection 40 mg  40 mg IntraVENous BID Nelda Andrea MD        And    sodium chloride (PF) 0.9 % injection 10 mL  10 mL IntraVENous BID Nelda Andrea MD        [START ON 9/15/2021] cefTRIAXone (ROCEPHIN) 1,000 mg in sterile water 10 mL IV syringe  1,000 mg IntraVENous Q24H Nelda Andrea MD        multivitamin 1 tablet  1 tablet Oral Daily Nelda Andrea MD        thiamine mononitrate tablet 100 mg  100 mg Oral Daily Nelda Andrea MD        folic acid (FOLVITE) tablet 1 mg  1 mg Oral Daily Nelda Andrea MD        calcium gluconate 1,000 mg in dextrose 5 % 100 mL IVPB  1,000 mg IntraVENous [facility] DO        magnesium sulfate 2000 mg in 50 mL IVPB premix  2,000 mg IntraVENous [facility] DO        potassium chloride 20 mEq/50 mL IVPB (Central Line)  20 mEq IntraVENous [facility] DO           Social History     Tobacco Use    Smoking status: Not on file   Substance Use Topics    Alcohol use: Not on file          Review of Systems:  Review of Systems   Constitutional: Positive for fatigue. Negative for fever. Respiratory: Negative for cough and shortness of breath. Cardiovascular: Negative for chest pain and palpitations. Gastrointestinal: Positive for abdominal pain (epigastric), nausea and vomiting (dark, coffee-ground).         Black stools     Genitourinary: Negative for dysuria and hematuria. Skin: Positive for pallor. Negative for wound. Neurological: Positive for light-headedness. New Imaging Reviewed:   Chest Xray no acute process      Physical Exam:  Physical Exam  Vitals and nursing note reviewed. Constitutional:       Appearance: He is ill-appearing. Eyes:      Extraocular Movements: Extraocular movements intact. Pupils: Pupils are equal, round, and reactive to light. Comments: Mild conjunctival pallor   Cardiovascular:      Rate and Rhythm: Regular rhythm. Tachycardia present. Pulses: Normal pulses. Heart sounds: Normal heart sounds. Pulmonary:      Effort: Pulmonary effort is normal. No respiratory distress. Breath sounds: No wheezing. Abdominal:      General: Abdomen is flat. There is no distension. Palpations: Abdomen is soft. Tenderness: There is abdominal tenderness (epigastric). There is no guarding or rebound. Musculoskeletal:      Cervical back: Normal range of motion and neck supple. Skin:     General: Skin is warm and dry. Capillary Refill: Capillary refill takes 2 to 3 seconds. Coloration: Skin is pale (mild generalized pallor). Neurological:      General: No focal deficit present. Mental Status: He is alert and oriented to person, place, and time. Comments: Patient is alert but does seemed fatigued and not at baseline     Psychiatric:         Mood and Affect: Mood normal.         Behavior: Behavior normal.         Assessment   Active Problems:    GI bleed  Resolved Problems:    * No resolved hospital problems.  *      Plan   Neuro: GCS 15, pain control, PRN tylenol, no acute issues  CV: intermittent tachycardia; hypotension which is improving s/p PRBC transfusion and variceal banding; monitor BP closely in setting of severe blood loss anemia  Pulm: tolerating room air, no acute issues, encourage IS, CXR no acute abnormalities  GI: GI bleed, s/p EGD 9/14 with Dr. Morro Resendiz with banding x1 of variceal bleed; GI recs keep NPO and if bleed again will need to consider TIPS; protonix, octreotide  Renal: Creatinine within normal limits; lactic acidosis 4.8 in context of hypotension and acute blood loss anemia; hyponatremia 128, chronic alcohol abuse; NPO currently, cont to monitor; daily CMP, Mg, Ion Ca; q6h lactic; hypoK, hypoMg, hypoCa, replace;   ID: afebrile  Endo: glucose near normal range, no acute issues  MSK: no acute issues  Heme: acute blood loss anemia 2/2 variceal bleed; s/p variceal banding x1 on 9/14; initial hb 5.4, increased to 7.0 after 2U PRBCs; daily CBC, q6h H&H; INR 2.3; TEG;         DVT Prophylaxis: SCDs  Ulcer Prophylaxis: GIB - IV protonix BID  Tubes and Lines: PIV and Central Line   Seizure proph:     No Indication  Ancillary consults:   Internal Medicine  and Gastroenterology   Family Update:         As available   CODE Status:       DNR-CCA    Dispo: SICU    [place], DO

## 2021-09-14 NOTE — PROCEDURES
[facility]    No varices seen initially, oozing from erosion At GE jxn  Normal cardia  Fundal clot  No ulcer antrum as previous noted  Normal duodenum    Erosion injected 0.5 cc x 3 epi  Streaming bleeding c/w varix began opposite wall  @ bands placed, no bleeding observed    I really did not see a varix and seemed to be nonvariceal bleeding arising from the esophagus but streaming blood under pressure could only be portal hypertension  Keep NPO, if bleeds agai will need to consider TIPS    Remain ICU on Octreotide

## 2021-09-14 NOTE — ANESTHESIA POSTPROCEDURE EVALUATION
Department of Anesthesiology  Postprocedure Note    Patient: Mariana Yepez  MRN: [id number]  YOB: 1950  Date of evaluation: 9/14/2021  Time:  5:26 PM     Procedure Summary     Date: 09/14/21 Room / Location: [address] / [facility]    Anesthesia Start: 4256 Anesthesia Stop: 2488    Procedures:       EGD ESOPHAGOGASTRODUODENOSCOPY  (possible banding)  BEDSIDE) (N/A )      EGD CONTROL HEMORRHAGE (N/A ) Diagnosis: (GIB)    Surgeons: Marlys Nath MD Responsible Provider: Linnea Phillips MD    Anesthesia Type: MAC ASA Status: 4 - Emergent          Anesthesia Type: MAC    Patrick Phase I:      Patrick Phase II:      Last vitals: Reviewed and per EMR flowsheets.        Anesthesia Post Evaluation    Patient location during evaluation: PACU  Patient participation: complete - patient participated  Level of consciousness: awake and alert  Pain score: 0  Airway patency: patent  Nausea & Vomiting: no vomiting and no nausea  Complications: no  Cardiovascular status: hemodynamically stable  Respiratory status: spontaneous ventilation  Hydration status: stable

## 2021-09-14 NOTE — H&P
Hospitalist History & Physical      PCP: Valentine Barraza DO    Date of Admission: 9/14/2021    Date of Service: Pt seen/examined on 9/14/2021     Chief Complaint:  had concerns including Hemoptysis (pt states it started at 230pm. +abdominal pain. ) and Rectal Bleeding. History Of Present Illness:    Mr. Evon Matta, a 69y.o. year old male  who  has a past medical history of Anemia, Ascites, and Encounter for blood transfusion. 69-year-old male patient presenting to emergency department chief complaint of hemoptysis and rectal bleeding. Patient has history of hepatic encephalopathy, variceal bleeding. Patient states he has been having hemoptysis since yesterday 2:30 AM.  He said states he had for 5 episodes of dark blood and coffee-ground material.  He is also having dark black tarry stools. Denies any chest pain, shortness of breath. He states he is nauseous and currently having generalized abdominal pain. Vital signs assessed in emergency department reveal the patient to be hypotensive with systolics in the 17T and tachycardic with a rate in the 120s. Laboratory studies revealed sodium 128, potassium 3.4, lactic acid 4.8, WBC 16.5 and hemoglobin 5.4. Patient was started on octreotide and Protonix infusions and transfused 2 units of packed red blood cells in the emergency department. Gastroenterology will be consulted and patient will be admitted to the ICU. Past Medical History:   Diagnosis Date    Anemia     Ascites     Encounter for blood transfusion        History reviewed. No pertinent surgical history. Prior to Admission medications    Medication Sig Start Date End Date Taking?  Authorizing Provider   nadolol (CORGARD) 20 MG tablet Take 20 mg by mouth daily    Historical Provider, MD   omeprazole (PRILOSEC) 40 MG delayed release capsule Take 40 mg by mouth daily    Historical Provider, MD   Multiple Vitamins-Minerals (MULTIVITAMIN ADULTS PO) Take 1 tablet by mouth daily Historical Provider, MD   doxepin (SINEQUAN) 10 MG capsule Take 10 mg by mouth nightly    Historical Provider, MD   predniSONE (DELTASONE) 10 MG tablet Take 10 mg by mouth daily    Historical Provider, MD   Thiamine HCl (VITAMIN B-1 PO) Take by mouth daily    Historical Provider, MD   fluticasone (VERAMYST) 27.5 MCG/SPRAY nasal spray 2 sprays by Each Nostril route daily    Historical Provider, MD         Allergies:  Penicillins    Social History:    TOBACCO:   has no history on file for tobacco use. ETOH:   has no history on file for alcohol use. Family History:    Reviewed in detail and negative for DM, CAD, Cancer, CVA. Positive as follows\"  History reviewed. No pertinent family history. REVIEW OF SYSTEMS:   Pertinent positives as noted in the HPI. All other systems reviewed and negative. PHYSICAL EXAM:  BP (!) 100/49   Pulse 114   Temp 98.1 °F (36.7 °C)   Resp 18   Ht 5' 9\" (1.753 m)   Wt 158 lb (71.7 kg)   SpO2 97%   BMI 23.33 kg/m²   General appearance: No acute distress, ill-appearing  HEENT: Normal cephalic, atraumatic without obvious deformity. Pupils equal, round, and reactive to light. Extra ocular muscles intact. Conjunctivae/corneas clear. Neck: Supple, with full range of motion. No jugular venous distention. Trachea midline. Respiratory: Clear to auscultation bilaterally  Cardiovascular: Tachycardic  Abdomen: Soft, generalized tenderness to palpation, nondistended  Musculoskeletal: No clubbing, cyanosis, edema of bilateral lower extremities. Brisk capillary refill. Skin: Normal skin color. No rashes or lesions. Neurologic:  Neurovascularly intact without any focal sensory/motor deficits.  Cranial nerves: II-XII intact, grossly non-focal.      CBC:   Recent Labs     09/14/21  0337   WBC 16.5*   RBC 2.50*   HGB 5.4*   HCT 19.8*   MCV 79.2*   RDW 20.4*        BMP:   Recent Labs     09/14/21 0337   *   K 3.4*   CL 96*   CO2 19*   BUN 27*   CREATININE 0.9     LFT:  Recent Labs     09/14/21  0337   PROT 5.3*   ALKPHOS 127   ALT 15   AST 41*   BILITOT 1.0   LIPASE 38     CE:  No results for input(s): CKTOTAL, TROPONINI in the last 72 hours. PT/INR: No results for input(s): INR, APTT in the last 72 hours. BNP: No results for input(s): BNP in the last 72 hours. ESR: No results found for: SEDRATE  CRP: No results found for: CRP  D Dimer: No results found for: DDIMER   Folate and B12: No results found for: [id number], No results found for: FOLATE  Lactic Acid:   Lab Results   Component Value Date    LACTA 4.8 ([facility]) 09/14/2021     Thyroid Studies: No results found for: TSH, X7KMLZK, B8KNOJA, THYROIDAB    Oupatient labs:  No results found for: CHOL, TRIG, HDL, LDLCALC, TSH, PSA, INR, LABA1C    Urinalysis:  No results found for: NITRU, WBCUA, BACTERIA, RBCUA, BLOODU, SPECGRAV, GLUCOSEU    Imaging:  CT ABDOMEN PELVIS WO CONTRAST Additional Contrast? None    Result Date: 9/14/2021  EXAMINATION: CT OF THE ABDOMEN AND PELVIS WITHOUT CONTRAST 9/14/2021 2:46 am TECHNIQUE: CT of the abdomen and pelvis was performed without the administration of intravenous contrast. Multiplanar reformatted images are provided for review. Dose modulation, iterative reconstruction, and/or weight based adjustment of the mA/kV was utilized to reduce the radiation dose to as low as reasonably achievable. COMPARISON: 03/13/2021 HISTORY: ORDERING SYSTEM PROVIDED HISTORY: abdominal pain TECHNOLOGIST PROVIDED HISTORY: Reason for exam:->abdominal pain Additional Contrast?->None Decision Support Exception - unselect if not a suspected or confirmed emergency medical condition->Emergency Medical Condition (MA) What reading provider will be dictating this exam?->CRC FINDINGS: Lower Chest: The visualized portions of the lung bases are clear. Organs: The liver appears cirrhotic. Within the limitations of a noncontrast exam, the visualized spleen, adrenal glands and kidneys demonstrate no significant abnormality.   There is some mild infiltration adjacent to pancreatic head. I cannot exclude very mild acute pancreatitis. GI/Bowel: There are no findings of intestinal obstruction. There is some wall thickening involving jejunal loops of bowel on the left side of the abdomen. There is mild adjacent edema in the mesentery. These findings are concerning for enteritis. The appendix is normal.  There is sigmoid diverticulosis. There is no diverticulitis seen. There is cholelithiasis. Pelvis:  Bladder is unremarkable in appearance. There is no abnormal pelvic mass or fluid collection seen. Peritoneum/Retroperitoneum: There are aortoiliac atherosclerotic calcification. There is no abdominal aortic aneurysm. There is mild ascites adjacent to the liver. There is no free intraperitoneal air. Bones/Soft Tissues: No acute abnormality. 1. Cirrhotic liver with very mild ascites. 2. Cholelithiasis. 3. Abnormal wall thickening involving jejunal bowel loops. This is suggestive of enteritis. 4. Minimal infiltration around pancreatic head. This could be very mild acute pancreatitis. Correlate clinically. 5. Diverticulosis. No acute diverticulitis seen. CT HEAD WO CONTRAST    Result Date: 9/14/2021  EXAMINATION: CT OF THE HEAD WITHOUT CONTRAST; CT OF THE CERVICAL SPINE WITHOUT CONTRAST 9/14/2021 2:46 am TECHNIQUE: CT of the head was performed without the administration of intravenous contrast. Dose modulation, iterative reconstruction, and/or weight based adjustment of the mA/kV was utilized to reduce the radiation dose to as low as reasonably achievable.; CT of the cervical spine was performed without the administration of intravenous contrast. Multiplanar reformatted images are provided for review. Dose modulation, iterative reconstruction, and/or weight based adjustment of the mA/kV was utilized to reduce the radiation dose to as low as reasonably achievable. COMPARISON: None.  HISTORY: ORDERING SYSTEM PROVIDED HISTORY: fall, ams TECHNOLOGIST PROVIDED HISTORY: Reason for exam:->fall, ams Has a \"code stroke\" or \"stroke alert\" been called? ->No Decision Support Exception - unselect if not a suspected or confirmed emergency medical condition->Emergency Medical Condition (MA) What reading provider will be dictating this exam?->CRC FINDINGS: CERVICAL SPINE: BONES/ALIGNMENT: There is no acute fracture or traumatic malalignment. DEGENERATIVE CHANGES: Multilevel degenerative changes are noted throughout the cervical spine. SOFT TISSUES: There is no prevertebral soft tissue swelling. HEAD: BRAIN/VENTRICLES: There is no acute intracranial hemorrhage, mass effect or midline shift. No abnormal extra-axial fluid collection. The gray-white differentiation is maintained without evidence of an acute infarct. There is no evidence of hydrocephalus. Chronic and age related changes including age-appropriate cerebral volume loss and scattered nonspecific white matter hypodensities which are likely the sequela of chronic small vessel ischemic disease. ORBITS: The visualized portion of the orbits demonstrate no acute abnormality. SINUSES: The visualized paranasal sinuses and mastoid air cells demonstrate no acute abnormality. SOFT TISSUES/SKULL: No acute abnormality of the visualized skull or soft tissues. No CT evidence of an acute intracranial abnormality. No evidence of acute fracture or traumatic malalignment of the cervical spine. Chronic and age related changes the head and cervical spine, as noted above.      CT CERVICAL SPINE WO CONTRAST    Result Date: 9/14/2021  EXAMINATION: CT OF THE HEAD WITHOUT CONTRAST; CT OF THE CERVICAL SPINE WITHOUT CONTRAST 9/14/2021 2:46 am TECHNIQUE: CT of the head was performed without the administration of intravenous contrast. Dose modulation, iterative reconstruction, and/or weight based adjustment of the mA/kV was utilized to reduce the radiation dose to as low as reasonably achievable.; CT of the cervical spine was performed without the administration of intravenous contrast. Multiplanar reformatted images are provided for review. Dose modulation, iterative reconstruction, and/or weight based adjustment of the mA/kV was utilized to reduce the radiation dose to as low as reasonably achievable. COMPARISON: None. HISTORY: ORDERING SYSTEM PROVIDED HISTORY: fall, ams TECHNOLOGIST PROVIDED HISTORY: Reason for exam:->fall, ams Has a \"code stroke\" or \"stroke alert\" been called? ->No Decision Support Exception - unselect if not a suspected or confirmed emergency medical condition->Emergency Medical Condition (MA) What reading provider will be dictating this exam?->CRC FINDINGS: CERVICAL SPINE: BONES/ALIGNMENT: There is no acute fracture or traumatic malalignment. DEGENERATIVE CHANGES: Multilevel degenerative changes are noted throughout the cervical spine. SOFT TISSUES: There is no prevertebral soft tissue swelling. HEAD: BRAIN/VENTRICLES: There is no acute intracranial hemorrhage, mass effect or midline shift. No abnormal extra-axial fluid collection. The gray-white differentiation is maintained without evidence of an acute infarct. There is no evidence of hydrocephalus. Chronic and age related changes including age-appropriate cerebral volume loss and scattered nonspecific white matter hypodensities which are likely the sequela of chronic small vessel ischemic disease. ORBITS: The visualized portion of the orbits demonstrate no acute abnormality. SINUSES: The visualized paranasal sinuses and mastoid air cells demonstrate no acute abnormality. SOFT TISSUES/SKULL: No acute abnormality of the visualized skull or soft tissues. No CT evidence of an acute intracranial abnormality. No evidence of acute fracture or traumatic malalignment of the cervical spine. Chronic and age related changes the head and cervical spine, as noted above.      XR CHEST PORTABLE    Result Date: 9/14/2021  EXAMINATION: ONE XRAY VIEW OF THE CHEST 9/14/2021 2:42 am COMPARISON: None. HISTORY: ORDERING SYSTEM PROVIDED HISTORY: hemoptysis TECHNOLOGIST PROVIDED HISTORY: Reason for exam:->hemoptysis What reading provider will be dictating this exam?->CRC FINDINGS: The lungs are without acute focal process. There is no effusion or pneumothorax. The cardiomediastinal silhouette is without acute process. The osseous structures are without acute process. No acute process. ASSESSMENT:  -Upper GI bleed  -History of esophageal varices  -Hemoptysis  -Acute blood loss anemia  -Lactic acidosis  -Hyponatremia  -Leukocytosis  -History of hepatic cirrhosis      PLAN:  -Admit to ICU  -Consult critical care  -Consult gastroenterology  -Transfuse 2 units of packed red blood cells  -Continue octreotide and Protonix infusions  -Monitor hemoglobin levels transfuse for less than 7.0  -N.p.o.  -Monitor serum electrolytes        Diet: No diet orders on file  Code Status: DNR-CCA  Surrogate decision maker confirmed with patient:   Extended Emergency Contact Information  Primary Emergency Contact: Kamron Estrada  Home Phone: [phone]  Relation: Other  Preferred language: English   needed? No    DVT Prophylaxis: []Lovenox []Heparin []PCD [] [address] []Encouraged ambulation  Disposition: []Med/Surg [] Intermediate [] ICU/CCU  Admit status: [] Observation [] Inpatient     +++++++++++++++++++++++++++++++++++++++++++++++++  Milla Kim DO  [address]  +++++++++++++++++++++++++++++++++++++++++++++++++  NOTE: This report was transcribed using voice recognition software. Every effort was made to ensure accuracy; however, inadvertent computerized transcription errors may be present.

## 2021-09-14 NOTE — CONSULTS
[address]                  [address] [facility],  [facility]                                  CONSULTATION    PATIENT NAME: Eh Lozoya                     :        1950  MED REC NO:   [id number]                            ROOM:       3805  ACCOUNT NO:   [de-identified]                           ADMIT DATE: 2021  PROVIDER:     Jameel Hernandez MD    CONSULT DATE:  2021    REASON FOR CONSULTATION:  Gastrointestinal bleeding/cirrhosis/acute  blood loss anemia. HISTORY:  This is a 99-DACP-VRK male, alcoholic, with an established  diagnosis of cirrhosis. He was hospitalized on two separate occasions  in  in [place]. He did spend time in the medical intensive care unit. Neither endoscopic evaluations described varices. In 10/2020, he had a  2 to 2.5 cm clean based ulcer in the antrum and mild changes of portal  hypertensive gastropathy. He was felt to be bleeding from ulcer disease  when hospitalized in 2020 as well. He was rehospitalized again in 2021. On that occasion, endoscopic  evaluation now described grade 3 varices with a red michelle sign and four  bands were applied. He was not actively bleeding at the time of that  exam.  His course was a little bit more danica, however. He had fever. Confusion. He was intubated. He aspirated. He had a lumbar puncture  and an EEG. His confusional state was attributed to metabolic  encephalopathy. Review of some available records indicates he has had ascites in the  past and has undergone paracentesis. He has a power of . He came to the [facility] instead of going  to [place] on this occasion. His power of  says she has been  trying to get him to go to the hospital for several days. He has had  issues including witnessed in the emergency room of hematemesis, though  none currently in the medical intensive care unit.   He was started on  pantoprazole and octreotide. He is currently hemodynamically stable,  but a bit lethargic. On arrival at 03:30 this morning, he had a sodium of 128, potassium of  3.4, BUN of 27 creatinine of 0.9, bilirubin of 1.0, AST of 41, and  ammonia level of 29, and an INR of 2.3. Imaging by CT was done without  contrast.  Clearly cirrhotic liver. Spleen is not big. A tiny amount  of ascites. It is impossible at this point to state whether he has  varices or not radiographically. PAST MEDICAL HISTORY:  From what I can  is pretty much centered  entirely around his history of alcoholism, cirrhosis, and its  complications. He has had numerous endoscopic evaluations of the upper  GI tract and at least one paracentesis. MEDICATIONS:  At home are listed as nadolol, omeprazole, Sinequan,  Deltasone, thiamine and fluticasone. SOCIAL HISTORY:  He has a significant other who also serves as his power  of . He is a former smoker apparently, and his current drinking  habits at this point are unclear. He has been drinking through his  other hospitalizations in [place]. OBJECTIVE:  He is in the medical intensive care unit. Recorded blood  pressure now 132/68, his pulse is 107. His saturation 99%. He awakens,  but cannot answer questions. He is not jaundiced, but he is pale. His  neck veins are not distended. Heart/lungs are  unremarkable. He has no  clinical ascites and no tenderness. ASSESSMENT:  GI bleeding, currently hemodynamically stable. I have  spoken to his power of  and suggested that he needs to be  evaluated endoscopically. Twice he was evaluated in 2020 and no  varices. Six months later, an endoscopic evaluation described large  varices. I cannot really comment on their presence or absence  radiographically at this point. Compliance on his omeprazole at this  point cannot be assessed. He had a CAT scan of the abdomen in 03/2021  with IV contrast.  Cirrhosis was obvious.   The ascites was minimal.   Varices were not described. PLAN:  EGD today. Continuing octreotide for now.         Aviva Monroe MD    D: 09/14/2021 9:54:13       T: 09/14/2021 9:59:46     [email or web address]  Job#: [id number]     Doc#: [id number]    CC:

## 2021-09-14 NOTE — PROGRESS NOTES
[facility]  SURGICAL INTENSIVE CARE UNIT    CRITICAL CARE ATTENDING PROGRESS NOTE    I have examined the patient, reviewed the record, and discussed the case with the APN/  Resident. I have reviewed all relevant labs and imaging data. Please refer to the  APN/ resident's note. I agree with the  assessment and plan with the following corrections/ additions. The following summarizes my clinical findings and independent assessment. CC: Upper GI bleed    HOSPITAL COURSE:  [id number] chronic alcoholic who has a history of hepatic encephalopathy as well as variceal bleeding patient came in with hemoptysis x1 day. He had 5 episodes of dark blood. He states that he drinks 2--20 ounce Malen Lowing hard lemonade a day. Currently he denies any abdominal pain. Patient had esophageal varices banded at [facility] in March 2021    EXAM:  Cachectic and ill-appearing  Slow to respond to questions  Breathing comfortably  Abdomen soft nontender tender nondistended reducible umbilical hernia      ASSESSMENT:  Active Problems:    GI bleed  Resolved Problems:    * No resolved hospital problems. *       PLAN:  Hemorrhagic shock/upper GI bleed  Likely secondary to bleeding varices  Gastroenterology has been consulted  On octreotide drip  We will start empiric Rocephin given cirrhosis due to alcoholism--SBP prophylaxis  Continue 40 mg IV Protonix twice daily  Will need nonselective beta-blocker once pressures tolerate  We will check labscheck INR, TEG, ammonia level  N.p.o. for now    Alcohol abusestart multivitamin thiamine and folate    FEN: Chronic hyponatremiadue to alcoholism    Endo: Monitor Blood Sugars. Target blood glucose less than 180 in the ICU. Patient has a history of prednisone 10 mg dailypatient is not sure if he takes it. He asked us to recheck his girlfriend Kate Ortega who helps out with his medications.   We will attempt to clarify this medication with his family    DVT prophylaxis--SCDS, hold anticoagulation for now secondary bleeding  GI Prophylaxis--PPI  Lines--femoral triple-lumen catheter9/14  CODE: FULL     DISPOSITION-Continue ICU Care    Critical care time exclusive of teaching and procedures = 35 min     Pt needs continuous ICU monitoring because the patient is at risk for deterioration from a hemorrhage standpoint. Tim Mckee MD, FACS  9/14/2021  8:45 AM    NOTE: This report was transcribed using voice recognition software. Every effort was made to ensure accuracy; however, inadvertent computerized transcription errors may be present.

## 2021-09-14 NOTE — ANESTHESIA PRE PROCEDURE
Department of Anesthesiology  Preprocedure Note       Name:  Matt Thomas   Age:  69 y.o.  :  1950                                          MRN:  [id number]         Date:  2021      Surgeon: Mendoza Dean):  Mita Witt MD    Procedure: Procedure(s):  EGD ESOPHAGOGASTRODUODENOSCOPY  (possible banding)  BEDSIDE)    Medications prior to admission:   Prior to Admission medications    Medication Sig Start Date End Date Taking?  Authorizing Provider   nadolol (CORGARD) 20 MG tablet Take 20 mg by mouth daily    Historical Provider, MD   omeprazole (PRILOSEC) 40 MG delayed release capsule Take 40 mg by mouth daily    Historical Provider, MD   Multiple Vitamins-Minerals (MULTIVITAMIN ADULTS PO) Take 1 tablet by mouth daily    Historical Provider, MD   doxepin (SINEQUAN) 10 MG capsule Take 10 mg by mouth nightly    Historical Provider, MD   predniSONE (DELTASONE) 10 MG tablet Take 10 mg by mouth daily    Historical Provider, MD   Thiamine HCl (VITAMIN B-1 PO) Take by mouth daily    Historical Provider, MD   fluticasone (VERAMYST) 27.5 MCG/SPRAY nasal spray 2 sprays by Each Nostril route daily    Historical Provider, MD       Current medications:    Current Facility-Administered Medications   Medication Dose Route Frequency Provider Last Rate Last Admin    octreotide (SANDOSTATIN) 500 mcg in sodium chloride 0.9 % 100 mL infusion  50 mcg/hr IntraVENous Continuous Ardella Candido, DO 10 mL/hr at 21 0550 50 mcg/hr at 21 0550    sodium chloride flush 0.9 % injection 5-40 mL  5-40 mL IntraVENous 2 times per day Ardella Candido, DO        sodium chloride flush 0.9 % injection 5-40 mL  5-40 mL IntraVENous PRN Ardella Candido, DO        0.9 % sodium chloride infusion  25 mL IntraVENous PRN Ardella Candido, DO        ondansetron (ZOFRAN-ODT) disintegrating tablet 4 mg  4 mg Oral Q8H PRN Ardella Candido, DO        Or    ondansetron [facility]) injection 4 mg  4 mg IntraVENous Q6H PRN Ardella Candido, DO       Sharlene Liu acetaminophen (TYLENOL) tablet 650 mg  650 mg Oral Q6H PRN Boothe Maeve, DO        Or    acetaminophen (TYLENOL) suppository 650 mg  650 mg Rectal Q6H PRN Tl Mcfarlane, DO        lactated ringers infusion   IntraVENous Continuous Alban Thakkar MD        pantoprazole (PROTONIX) injection 40 mg  40 mg IntraVENous BID Alban Thakkar MD        And    sodium chloride (PF) 0.9 % injection 10 mL  10 mL IntraVENous BID Alban Thakkar MD        [START ON 9/15/2021] cefTRIAXone (ROCEPHIN) 1,000 mg in sterile water 10 mL IV syringe  1,000 mg IntraVENous Q24H Alban Thakkar MD        multivitamin 1 tablet  1 tablet Oral Daily Alban Thakkar MD        thiamine mononitrate tablet 100 mg  100 mg Oral Daily Alban Thakkar MD        folic acid (FOLVITE) tablet 1 mg  1 mg Oral Daily Alban Thakkar MD           Allergies: Allergies   Allergen Reactions    Penicillins        Problem List:    Patient Active Problem List   Diagnosis Code    GI bleed K92.2       Past Medical History:        Diagnosis Date    Anemia     Ascites     Encounter for blood transfusion        Past Surgical History:  History reviewed. No pertinent surgical history.     Social History:    Social History     Tobacco Use    Smoking status: Not on file   Substance Use Topics    Alcohol use: Not on file                                Counseling given: Not Answered      Vital Signs (Current):   Vitals:    09/14/21 0630 09/14/21 0645 09/14/21 0805 09/14/21 0810   BP: 101/61 (!) 85/56 109/62 120/61   Pulse: 118 122 104 103   Resp: 18 19 15 23   Temp: 36.2 °C (97.1 °F) 36.5 °C (97.7 °F) 36.4 °C (97.6 °F) 36.4 °C (97.6 °F)   TempSrc:  Axillary     SpO2: 97% 100%     Weight:  142 lb 3.2 oz (64.5 kg)     Height:  5' 9\" (1.753 m)                                                BP Readings from Last 3 Encounters:   09/14/21 120/61   03/13/21 119/86   09/27/20 125/89       NPO Status: BMI:   Wt Readings from Last 3 Encounters:   09/14/21 142 lb 3.2 oz (64.5 kg)   03/13/21 158 lb (71.7 kg)   09/27/20 145 lb (65.8 kg)     Body mass index is 21 kg/m². CBC:   Lab Results   Component Value Date    WBC 16.5 09/14/2021    RBC 2.50 09/14/2021    HGB 5.4 09/14/2021    HCT 19.8 09/14/2021    MCV 79.2 09/14/2021    RDW 20.4 09/14/2021     09/14/2021       CMP:   Lab Results   Component Value Date     09/14/2021    K 3.4 09/14/2021    K 4.1 03/13/2021    CL 96 09/14/2021    CO2 19 09/14/2021    BUN 27 09/14/2021    CREATININE 0.9 09/14/2021    GFRAA >60 09/14/2021    LABGLOM >60 09/14/2021    GLUCOSE 118 09/14/2021    PROT 5.3 09/14/2021    CALCIUM 7.6 09/14/2021    BILITOT 1.0 09/14/2021    ALKPHOS 127 09/14/2021    AST 41 09/14/2021    ALT 15 09/14/2021       POC Tests: No results for input(s): POCGLU, POCNA, POCK, POCCL, POCBUN, POCHEMO, POCHCT in the last 72 hours.     Coags:   Lab Results   Component Value Date    PROTIME 25.5 09/14/2021    INR 2.3 09/14/2021       HCG (If Applicable): No results found for: PREGTESTUR, PREGSERUM, HCG, HCGQUANT     ABGs: No results found for: PHART, PO2ART, [id number], OOK0JUW, BEART, [id number]     Type & Screen (If Applicable):  No results found for: LABABO, LABRH    Drug/Infectious Status (If Applicable):  No results found for: HIV, HEPCAB    COVID-19 Screening (If Applicable):   Lab Results   Component Value Date    COVID19 Not Detected 09/14/2021    COVID19 Not Detected 03/13/2021           Anesthesia Evaluation  Patient summary reviewed and Nursing notes reviewed  Airway: Mallampati: III  TM distance: >3 FB   Neck ROM: full  Mouth opening: < 3 FB Dental:          Pulmonary:Negative Pulmonary ROS                              Cardiovascular:Negative CV ROS            Rhythm: regular  Rate: abnormal                   PE comment: tachycardia   Neuro/Psych:                ROS comment: etoh abuse  Hepatic encephalopathy alert to person only GI/Hepatic/Renal:   (+) liver disease: coagulopathy from hepatic dysfunction, hepatic encephalopathy, esophageal varices,           Endo/Other:                     Abdominal:             Vascular: negative vascular ROS. Other Findings:             Anesthesia Plan      MAC     ASA 4 - emergent             Anesthetic plan and risks discussed with patient and healthcare power of . Plan discussed with CRNA and attending. Attending anesthesiologist reviewed and agrees with Preprocedure content            [address], APRN - CRNA   9/14/2021        DOS STAFF ADDENDUM:    Pt seen and examined, physical exam updated, chart reviewed including anesthesia, drug and allergy history. H&P reviewed. No interval changes to history or physical examination (unless noted above). NPO status confirmed. Anesthetic plan, risks, benefits, alternatives discussed with patient. Patient verbalized an understanding and agrees to proceed.      Marla Cunningham MD  Anesthesiologist

## 2021-09-14 NOTE — ED NOTES
Consent for blood products signed by pt significant other. Pt reports feeling too weak to sign at this time. Witnessed by this nurse and VISHNU Flores LeaverGRIS  09/14/21 2303

## 2021-09-14 NOTE — ED PROVIDER NOTES
HPI   48-year-old male patient presenting to emergency department chief complaint of hemoptysis and rectal bleeding. Patient has history of hepatic encephalopathy, variceal bleeding. Patient states he has been having hemoptysis since yesterday 2:30 AM.  He said states he had for 5 episodes of dark blood and coffee-ground material.  He is also having dark black tarry stools. Denies any chest pain, shortness of breath. He states he is nauseous and currently having generalized abdominal pain. Symptoms not better or worse with anything, and intermittent. Patient denies any anticoagulation use. Review of Systems   Constitutional: Negative for chills and fever. HENT: Negative for congestion. Respiratory: Negative for cough and shortness of breath. Cardiovascular: Positive for chest pain. Gastrointestinal: Positive for abdominal pain, nausea and vomiting. Genitourinary: Negative for dysuria and frequency. Skin: Negative for rash. Neurological: Negative for syncope. All other systems reviewed and are negative. Physical Exam  Vitals and nursing note reviewed. Constitutional:       Appearance: He is ill-appearing. HENT:      Head: Normocephalic and atraumatic. Nose: Nose normal. No congestion. Mouth/Throat:      Pharynx: Oropharynx is clear. Eyes:      General: No scleral icterus. Conjunctiva/sclera: Conjunctivae normal.   Cardiovascular:      Rate and Rhythm: Regular rhythm. Tachycardia present. Pulmonary:      Effort: Pulmonary effort is normal. No respiratory distress. Breath sounds: Normal breath sounds. Abdominal:      General: Bowel sounds are normal.      Palpations: Abdomen is soft. Tenderness: There is abdominal tenderness (generalized). Genitourinary:     Comments: Female nurse present during Hemoccult. Hemoccult was positive dark black tarry stools on examination. Musculoskeletal:      Right lower leg: No edema. Left lower leg: No edema. Skin:     General: Skin is warm and dry. Capillary Refill: Capillary refill takes less than 2 seconds. Comments: Small ulceration on right buttock noninfected. Neurological:      Mental Status: He is alert and oriented to person, place, and time. Procedures     MDM   43-year-old male patient presenting for complaint of weakness, GI bleeding. Patient has history of cirrhosis , variceal banding, GI ulcer. Hemoglobin here is 5.4.  2 units of blood ordered for transfusion. 80 mg of Protonix given patient started on octreotide drip and given 1 g of Rocephin for SBP prophylaxis. At this time patient to be admitted to the ICU. EKG: This EKG is signed and interpreted by me. Rate:126  Rhythm: Sinus  Interpretation: sinus tachycardia, LAD  Comparison: stable as compared to patient's most recent EKG    ED Course as of Sep 14 0612   Tue Sep 14, 2021   0215 Spoke with patient's emergency contact Chayo William. States that patient has bleeding ulcer, history of liver failure and esophageal varices. States he has been dealing with this for the last year and has been going to [facility] for treatment but wanted to come to [facility]. Patient has been feeling weak over the last week as well as having increasing falls, hit his head multiple times over the last week as well with positive loss of consciousness. [FG]   0410 Notified patient has hemoglobin of 5.4.    [FG]   3163 Nurse notified me that patient having dark bloody bowel movements here as well as coughing up some blood. [FG]   348 Spoke with Dr. Lynda Gilliam accepted patient to MICU but no beds available currently    [FG]   8654 Spoke with Dr. Tamia Naqvi accepts patient to SICU, beds available there    [FG]   5707 Call out to Dr. Renata Dawson    [FG]      ED Course User Index  [FG] James Hernandez DO     ED Course as of Sep 14 0612   Tue Sep 14, 2021   0215 Spoke with patient's emergency contact Chayo William.   States that patient has bleeding ulcer, history of liver failure and esophageal varices. States he has been dealing with this for the last year and has been going to [facility] for treatment but wanted to come to [facility]. Patient has been feeling weak over the last week as well as having increasing falls, hit his head multiple times over the last week as well with positive loss of consciousness. [FG]   0410 Notified patient has hemoglobin of 5.4.    [FG]   1598 Nurse notified me that patient having dark bloody bowel movements here as well as coughing up some blood. [FG]   3638 Spoke with Dr. Kesha Barnhart accepted patient to MICU but no beds available currently    [FG]   6124 Spoke with Dr. Arturo Saravia accepts patient to SICU, beds available there    [FG]   1012 Call out to Dr. Valery Gresham    [FG]      ED Course User Index  [FG] Disha Kumar, DO       --------------------------------------------- PAST HISTORY ---------------------------------------------  Past Medical History:  has a past medical history of Anemia, Ascites, and Encounter for blood transfusion. Past Surgical History:  has no past surgical history on file. Social History:      Family History: family history is not on file. The patients home medications have been reviewed.     Allergies: Penicillins    -------------------------------------------------- RESULTS -------------------------------------------------    LABS:  Results for orders placed or performed during the hospital encounter of 09/14/21   COVID-19, Rapid    Specimen: Nasopharyngeal Swab   Result Value Ref Range    SARS-CoV-2, NAAT Not Detected Not Detected   CBC auto differential   Result Value Ref Range    WBC 16.5 (H) 4.5 - 11.5 E9/L    RBC 2.50 (L) 3.80 - 5.80 E12/L    Hemoglobin 5.4 (LL) 12.5 - 16.5 g/dL    Hematocrit 19.8 (L) 37.0 - 54.0 %    MCV 79.2 (L) 80.0 - 99.9 fL    MCH 21.6 (L) 26.0 - 35.0 pg    MCHC 27.3 (L) 32.0 - 34.5 %    RDW 20.4 (H) 11.5 - 15.0 fL    Platelets 364 130 - 450 E9/L    MPV 11.1 7.0 - 12.0 fL   Comprehensive Metabolic Panel   Result Value Ref Range    Sodium 128 (L) 132 - 146 mmol/L    Potassium 3.4 (L) 3.5 - 5.0 mmol/L    Chloride 96 (L) 98 - 107 mmol/L    CO2 19 (L) 22 - 29 mmol/L    Anion Gap 13 7 - 16 mmol/L    Glucose 118 (H) 74 - 99 mg/dL    BUN 27 (H) 6 - 23 mg/dL    CREATININE 0.9 0.7 - 1.2 mg/dL    GFR Non-African American >60 >=60 mL/min/1.73    GFR African American >60     Calcium 7.6 (L) 8.6 - 10.2 mg/dL    Total Protein 5.3 (L) 6.4 - 8.3 g/dL    Albumin 2.2 (L) 3.5 - 5.2 g/dL    Total Bilirubin 1.0 0.0 - 1.2 mg/dL    Alkaline Phosphatase 127 40 - 129 U/L    ALT 15 0 - 40 U/L    AST 41 (H) 0 - 39 U/L   Lipase   Result Value Ref Range    Lipase 38 13 - 60 U/L   Lactic Acid, Plasma   Result Value Ref Range    Lactic Acid 4.8 (HH) 0.5 - 2.2 mmol/L   Troponin   Result Value Ref Range    Troponin, High Sensitivity 18 (H) 0 - 11 ng/L   Ammonia   Result Value Ref Range    Ammonia 29.0 16.0 - 60.0 umol/L   Serum Drug Screen   Result Value Ref Range    Ethanol Lvl <10 mg/dL    Acetaminophen Level <5.0 (L) 10.0 - 34.9 mcg/mL    Salicylate, Serum 1.4 0.0 - 30.0 mg/dL    TCA Scrn NEGATIVE Cutoff:300 ng/mL   EKG 12 Lead   Result Value Ref Range    Ventricular Rate 126 BPM    Atrial Rate 126 BPM    P-R Interval 134 ms    QRS Duration 98 ms    Q-T Interval 348 ms    QTc Calculation (Bazett) 504 ms    P Axis 67 degrees    R Axis -31 degrees    T Axis 24 degrees   TYPE AND SCREEN   Result Value Ref Range    ABO/Rh AB POS     Antibody Screen NEG    PREPARE RBC (CROSSMATCH), 2 Units   Result Value Ref Range    Product Code Blood Bank [id number]     Description Blood Bank Red Blood Cells, Leuko-reduced     Unit Number [id number]     Dispense Status Blood Bank issued     Product Code Blood Bank [id number]     Description Blood Bank Red Blood Cells, Leuko-reduced     Unit Number [id number]     Dispense Status Blood Bank selected        RADIOLOGY:  CT ABDOMEN PELVIS WO CONTRAST Additional Contrast? None   Final Result   1. Cirrhotic liver with very mild ascites. 2. Cholelithiasis. 3. Abnormal wall thickening involving jejunal bowel loops. This is   suggestive of enteritis. 4. Minimal infiltration around pancreatic head. This could be very mild   acute pancreatitis. Correlate clinically. 5. Diverticulosis. No acute diverticulitis seen. CT HEAD WO CONTRAST   Final Result   No CT evidence of an acute intracranial abnormality. No evidence of acute fracture or traumatic malalignment of the cervical spine. Chronic and age related changes the head and cervical spine, as noted above. CT CERVICAL SPINE WO CONTRAST   Final Result   No CT evidence of an acute intracranial abnormality. No evidence of acute fracture or traumatic malalignment of the cervical spine. Chronic and age related changes the head and cervical spine, as noted above. XR CHEST PORTABLE   Final Result   No acute process. ------------------------- NURSING NOTES AND VITALS REVIEWED ---------------------------  Date / Time Roomed:  9/14/2021  2:22 AM  ED Bed Assignment:  24/24    The nursing notes within the ED encounter and vital signs as below have been reviewed.      Patient Vitals for the past 24 hrs:   BP Temp Pulse Resp SpO2 Height Weight   09/14/21 0603 (!) 96/57 96.8 °F (36 °C) 126 17      09/14/21 0520 93/62  113 20 100 %     09/14/21 0508 (!) 106/58  113 18 99 %     09/14/21 0446 (!) 89/54  116 17 98 %     09/14/21 0345 (!) 100/49  114 18 97 %     09/14/21 0136 (!) 110/52 98.1 °F (36.7 °C) 120 18 100 % 5' 9\" (1.753 m) 158 lb (71.7 kg)       Oxygen Saturation Interpretation: Normal    ------------------------------------------ PROGRESS NOTES ------------------------------------------    Counseling:  I have spoken with the patient and discussed todays results, in addition to providing specific details for the plan of care and counseling regarding the diagnosis and prognosis. Their questions are answered at this time and they are agreeable with the plan of admission.    --------------------------------- ADDITIONAL PROVIDER NOTES ---------------------------------  Consultations:  Time: 4:45. Spoke with Dr. Morales Wallace. Discussed case. They will admit the patient. This patient's ED course included: a personal history and physicial examination, re-evaluation prior to disposition, multiple bedside re-evaluations, IV medications, cardiac monitoring, continuous pulse oximetry and complex medical decision making and emergency management    This patient has remained hemodynamically stable during their ED course. Diagnosis:  1. Gastrointestinal hemorrhage, unspecified gastrointestinal hemorrhage type    2. Hepatic cirrhosis, unspecified hepatic cirrhosis type, unspecified whether ascites present ([facility] [address]        Disposition:  Patient's disposition: Admit to CCU/ICU  Patient's condition is stable. Corinne Fay DO  Resident  09/14/21 7658    ATTENDING PROVIDER ATTESTATION:     I have personally performed and/or participated in the history, exam, medical decision making, and procedures and agree with all pertinent clinical information. I have also reviewed and agree with the past medical, family and social history unless otherwise noted. I have discussed this patient in detail with the resident, and provided the instruction and education regarding gi bleed. My findings/Plan: I was the primary provider for patient. Patient presenting here because of vomiting blood as well as reported passing black stools. Patient reporting no fever no chills. Patient does have history of esophageal varices. Patient reporting no chest pain he reports no shortness of breath. Patient does report history of liver disease. Patient is awake alert he is oriented he does appear to be pale. Heart lung exam normal abdomen he is tender throughout.   He is able to move all extremities. No noted edema on exam.  Patient labs are reviewed Hemoccult was positive. Patient given IV fluids. Patient blood pressure did drop down into the 80s at one point. Patient ordered packed red blood cells. We did speak to family he reports that patient has been falling at home and striking his head. CTs of the head and neck as well as abdomen were ordered. EKG noted and reviewed. Patient family made aware of findings. Vital signs noted and rechecked several times here. Patient family made aware of plan and admission. We did speak to on-call internal medicine. And patient will be admitted to SICU. Call was placed to GI for evaluation. From prior reports and prior evaluations at [facility] patient does have history of esophageal banding. Patient was started on octreotide here as well. Please note that the withdrawal or failure to initiate urgent interventions for this patient would likely result in a life threatening deterioration or permanent disability. Accordingly this patient received 30 minutes of critical care time, excluding separately billable procedures.            Liz Salazar MD  09/14/21 [address] Nelly Nevarez MD  09/14/21 0642       Liz Salazar MD  09/14/21 1030

## 2021-09-14 NOTE — PROCEDURES
Rosangela Crowder is a 69 y.o. male patient. 1. Gastrointestinal hemorrhage, unspecified gastrointestinal hemorrhage type    2. Hepatic cirrhosis, unspecified hepatic cirrhosis type, unspecified whether ascites present [facility])      Past Medical History:   Diagnosis Date    Anemia     Ascites     Encounter for blood transfusion      Blood pressure 120/65, pulse 125, temperature 97 °F (36.1 °C), temperature source Skin, resp. rate 24, height 5' 9\" (1.753 m), weight 142 lb 3.2 oz (64.5 kg), SpO2 100 %. Central Line    Date/Time: 9/14/2021 11:52 AM  Performed by: Piter Arechiga DO  Authorized by: Piter Arechiga DO   Consent: Written consent obtained. Risks and benefits: risks, benefits and alternatives were discussed  Consent given by: patient  Patient understanding: patient states understanding of the procedure being performed  Patient consent: the patient's understanding of the procedure matches consent given  Procedure consent: procedure consent matches procedure scheduled  Relevant documents: relevant documents present and verified  Required items: required blood products, implants, devices, and special equipment available  Patient identity confirmed: arm band, provided demographic data and hospital-assigned identification number  Time out: Immediately prior to procedure a \"time out\" was called to verify the correct patient, procedure, equipment, support staff and site/side marked as required.   Indications: vascular access  Anesthesia: local infiltration    Anesthesia:  Local Anesthetic: lidocaine 1% with epinephrine  Preparation: skin prepped with 2% chlorhexidine  Skin prep agent dried: skin prep agent completely dried prior to procedure  Sterile barriers: all five maximum sterile barriers used - cap, mask, sterile gown, sterile gloves, and large sterile sheet  Hand hygiene: hand hygiene performed prior to central venous catheter insertion  Location details: right femoral  Catheter type: triple lumen  Catheter size: 7 Fr  Pre-procedure: landmarks identified  Ultrasound guidance: yes  Sterile ultrasound techniques: sterile gel and sterile probe covers were used  Number of attempts: 1  Successful placement: yes  Post-procedure: line sutured and dressing applied  Assessment: blood return through all ports and free fluid flow  Patient tolerance: patient tolerated the procedure well with no immediate complications  Comments:  This procedure was supervised by my attending Taylor Mnoae MD.           Yas Galloway DO  9/14/2021

## 2021-09-15 LAB
ALBUMIN SERPL-MCNC: 2.1 G/DL (ref 3.5–5.2)
ALP BLD-CCNC: 91 U/L (ref 40–129)
ALT SERPL-CCNC: 13 U/L (ref 0–40)
ANGLE (CLOT STRENGTH): 74.1 DEGREE (ref 59–74)
ANION GAP SERPL CALCULATED.3IONS-SCNC: 8 MMOL/L (ref 7–16)
ANION GAP SERPL CALCULATED.3IONS-SCNC: 8 MMOL/L (ref 7–16)
AST SERPL-CCNC: 29 U/L (ref 0–39)
BASOPHILS ABSOLUTE: 0.08 E9/L (ref 0–0.2)
BASOPHILS RELATIVE PERCENT: 0.7 % (ref 0–2)
BILIRUB SERPL-MCNC: 1.9 MG/DL (ref 0–1.2)
BLOOD BANK DISPENSE STATUS: NORMAL
BLOOD BANK PRODUCT CODE: NORMAL
BPU ID: NORMAL
BUN BLDV-MCNC: 23 MG/DL (ref 6–23)
BUN BLDV-MCNC: 24 MG/DL (ref 6–23)
CALCIUM IONIZED: 1.2 MMOL/L (ref 1.15–1.33)
CALCIUM IONIZED: 1.26 MMOL/L (ref 1.15–1.33)
CALCIUM SERPL-MCNC: 7.7 MG/DL (ref 8.6–10.2)
CALCIUM SERPL-MCNC: 8.1 MG/DL (ref 8.6–10.2)
CHLORIDE BLD-SCNC: 107 MMOL/L (ref 98–107)
CHLORIDE BLD-SCNC: 108 MMOL/L (ref 98–107)
CO2: 18 MMOL/L (ref 22–29)
CO2: 19 MMOL/L (ref 22–29)
CORTISOL TOTAL: 17.74 MCG/DL (ref 2.68–18.4)
CREAT SERPL-MCNC: 0.8 MG/DL (ref 0.7–1.2)
CREAT SERPL-MCNC: 0.9 MG/DL (ref 0.7–1.2)
DESCRIPTION BLOOD BANK: NORMAL
EOSINOPHILS ABSOLUTE: 0.49 E9/L (ref 0.05–0.5)
EOSINOPHILS RELATIVE PERCENT: 4 % (ref 0–6)
G-TEG: 8.2 K D/SC (ref 4.5–11)
GFR AFRICAN AMERICAN: >60
GFR AFRICAN AMERICAN: >60
GFR NON-AFRICAN AMERICAN: >60 ML/MIN/1.73
GFR NON-AFRICAN AMERICAN: >60 ML/MIN/1.73
GLUCOSE BLD-MCNC: 106 MG/DL (ref 74–99)
GLUCOSE BLD-MCNC: 84 MG/DL (ref 74–99)
HCT VFR BLD CALC: 25.3 % (ref 37–54)
HCT VFR BLD CALC: 25.7 % (ref 37–54)
HCT VFR BLD CALC: 26.5 % (ref 37–54)
HEMOGLOBIN: 8 G/DL (ref 12.5–16.5)
HEMOGLOBIN: 8.1 G/DL (ref 12.5–16.5)
HEMOGLOBIN: 8.5 G/DL (ref 12.5–16.5)
IMMATURE GRANULOCYTES #: 0.27 E9/L
IMMATURE GRANULOCYTES %: 2.2 % (ref 0–5)
INR BLD: 1.7
IRON SATURATION: 86 % (ref 20–55)
IRON: 142 MCG/DL (ref 59–158)
K (CLOTTING TIME): 1.2 MIN (ref 1–3)
LACTIC ACID: 1.7 MMOL/L (ref 0.5–2.2)
LYMPHOCYTES ABSOLUTE: 1.99 E9/L (ref 1.5–4)
LYMPHOCYTES RELATIVE PERCENT: 16.4 % (ref 20–42)
MA (MAX AMPLITUDE): 62.2 MM (ref 50–70)
MAGNESIUM: 2.5 MG/DL (ref 1.6–2.6)
MAGNESIUM: 2.7 MG/DL (ref 1.6–2.6)
MCH RBC QN AUTO: 25.5 PG (ref 26–35)
MCHC RBC AUTO-ENTMCNC: 31.5 % (ref 32–34.5)
MCV RBC AUTO: 80.8 FL (ref 80–99.9)
MONOCYTES ABSOLUTE: 1 E9/L (ref 0.1–0.95)
MONOCYTES RELATIVE PERCENT: 8.2 % (ref 2–12)
MRSA CULTURE ONLY: NORMAL
NEUTROPHILS ABSOLUTE: 8.3 E9/L (ref 1.8–7.3)
NEUTROPHILS RELATIVE PERCENT: 68.5 % (ref 43–80)
PDW BLD-RTO: 16.7 FL (ref 11.5–15)
PHOSPHORUS: 1.6 MG/DL (ref 2.5–4.5)
PLATELET # BLD: 105 E9/L (ref 130–450)
PMV BLD AUTO: 10.5 FL (ref 7–12)
POTASSIUM REFLEX MAGNESIUM: 3.2 MMOL/L (ref 3.5–5)
POTASSIUM REFLEX MAGNESIUM: 4.1 MMOL/L (ref 3.5–5)
PROTHROMBIN TIME: 18.9 SEC (ref 9.3–12.4)
R (REACTION TIME): 3.2 MIN (ref 5–10)
RBC # BLD: 3.18 E12/L (ref 3.8–5.8)
SODIUM BLD-SCNC: 134 MMOL/L (ref 132–146)
SODIUM BLD-SCNC: 134 MMOL/L (ref 132–146)
TOTAL IRON BINDING CAPACITY: 166 MCG/DL (ref 250–450)
TOTAL PROTEIN: 4.5 G/DL (ref 6.4–8.3)
WBC # BLD: 12.1 E9/L (ref 4.5–11.5)

## 2021-09-15 PROCEDURE — 2000000000 HC ICU R&B

## 2021-09-15 PROCEDURE — 36415 COLL VENOUS BLD VENIPUNCTURE: CPT

## 2021-09-15 PROCEDURE — 80053 COMPREHEN METABOLIC PANEL: CPT

## 2021-09-15 PROCEDURE — 36592 COLLECT BLOOD FROM PICC: CPT

## 2021-09-15 PROCEDURE — 83540 ASSAY OF IRON: CPT

## 2021-09-15 PROCEDURE — 82533 TOTAL CORTISOL: CPT

## 2021-09-15 PROCEDURE — 99233 SBSQ HOSP IP/OBS HIGH 50: CPT | Performed by: SURGERY

## 2021-09-15 PROCEDURE — 85014 HEMATOCRIT: CPT

## 2021-09-15 PROCEDURE — 84100 ASSAY OF PHOSPHORUS: CPT

## 2021-09-15 PROCEDURE — C9113 INJ PANTOPRAZOLE SODIUM, VIA: HCPCS | Performed by: STUDENT IN AN ORGANIZED HEALTH CARE EDUCATION/TRAINING PROGRAM

## 2021-09-15 PROCEDURE — 85025 COMPLETE CBC W/AUTO DIFF WBC: CPT

## 2021-09-15 PROCEDURE — 6370000000 HC RX 637 (ALT 250 FOR IP): Performed by: STUDENT IN AN ORGANIZED HEALTH CARE EDUCATION/TRAINING PROGRAM

## 2021-09-15 PROCEDURE — 2580000003 HC RX 258: Performed by: FAMILY MEDICINE

## 2021-09-15 PROCEDURE — 6360000002 HC RX W HCPCS: Performed by: SURGERY

## 2021-09-15 PROCEDURE — 83735 ASSAY OF MAGNESIUM: CPT

## 2021-09-15 PROCEDURE — 85610 PROTHROMBIN TIME: CPT

## 2021-09-15 PROCEDURE — 82330 ASSAY OF CALCIUM: CPT

## 2021-09-15 PROCEDURE — 6360000002 HC RX W HCPCS: Performed by: STUDENT IN AN ORGANIZED HEALTH CARE EDUCATION/TRAINING PROGRAM

## 2021-09-15 PROCEDURE — 83550 IRON BINDING TEST: CPT

## 2021-09-15 PROCEDURE — 6360000002 HC RX W HCPCS: Performed by: FAMILY MEDICINE

## 2021-09-15 PROCEDURE — 2580000003 HC RX 258: Performed by: STUDENT IN AN ORGANIZED HEALTH CARE EDUCATION/TRAINING PROGRAM

## 2021-09-15 PROCEDURE — 85018 HEMOGLOBIN: CPT

## 2021-09-15 RX ORDER — NADOLOL 20 MG/1
20 TABLET ORAL DAILY
Status: DISCONTINUED | OUTPATIENT
Start: 2021-09-15 | End: 2021-09-16

## 2021-09-15 RX ORDER — POTASSIUM CHLORIDE 29.8 MG/ML
20 INJECTION INTRAVENOUS
Status: COMPLETED | OUTPATIENT
Start: 2021-09-15 | End: 2021-09-15

## 2021-09-15 RX ADMIN — SODIUM CHLORIDE, PRESERVATIVE FREE 10 ML: 5 INJECTION INTRAVENOUS at 20:59

## 2021-09-15 RX ADMIN — POTASSIUM CHLORIDE 20 MEQ: 400 INJECTION, SOLUTION INTRAVENOUS at 02:59

## 2021-09-15 RX ADMIN — PANTOPRAZOLE SODIUM 40 MG: 40 INJECTION, POWDER, FOR SOLUTION INTRAVENOUS at 20:58

## 2021-09-15 RX ADMIN — Medication 500 MG: at 20:59

## 2021-09-15 RX ADMIN — SODIUM CHLORIDE, PRESERVATIVE FREE 10 ML: 5 INJECTION INTRAVENOUS at 08:49

## 2021-09-15 RX ADMIN — POTASSIUM CHLORIDE 20 MEQ: 400 INJECTION, SOLUTION INTRAVENOUS at 04:06

## 2021-09-15 RX ADMIN — PANTOPRAZOLE SODIUM 40 MG: 40 INJECTION, POWDER, FOR SOLUTION INTRAVENOUS at 08:49

## 2021-09-15 RX ADMIN — CEFTRIAXONE 1000 MG: 1 INJECTION, POWDER, FOR SOLUTION INTRAMUSCULAR; INTRAVENOUS at 10:30

## 2021-09-15 RX ADMIN — Medication 500 MG: at 18:42

## 2021-09-15 RX ADMIN — NADOLOL 20 MG: 20 TABLET ORAL at 11:00

## 2021-09-15 RX ADMIN — Medication 1 TABLET: at 08:48

## 2021-09-15 RX ADMIN — Medication 100 MG: at 08:48

## 2021-09-15 RX ADMIN — Medication 500 MG: at 14:07

## 2021-09-15 RX ADMIN — POTASSIUM CHLORIDE 20 MEQ: 400 INJECTION, SOLUTION INTRAVENOUS at 02:02

## 2021-09-15 RX ADMIN — OCTREOTIDE ACETATE 50 MCG/HR: 500 INJECTION, SOLUTION INTRAVENOUS; SUBCUTANEOUS at 02:01

## 2021-09-15 RX ADMIN — FOLIC ACID 1 MG: 1 TABLET ORAL at 08:48

## 2021-09-15 ASSESSMENT — PAIN SCALES - GENERAL
PAINLEVEL_OUTOF10: 0

## 2021-09-15 ASSESSMENT — PAIN SCALES - WONG BAKER: WONGBAKER_NUMERICALRESPONSE: 0

## 2021-09-15 NOTE — OP NOTE
[address]                  [address] [facility],  [facility]                                OPERATIVE REPORT    PATIENT NAME: Vega Hooks                     :        1950  MED REC NO:   [id number]                            ROOM:       3805  ACCOUNT NO:   [de-identified]                           ADMIT DATE: 2021  PROVIDER:     Parth Reyes MD    DATE OF PROCEDURE:  2021    PROCEDURES:  Esophagogastroduodenoscopy plus band ligation of esophageal  varices. PREOPERATIVE DIAGNOSES:  Gastrointestinal hemorrhage, acute blood loss  anemia, cirrhosis, history of esophageal varices. POSTOPERATIVE DIAGNOSES:  1.  Small area of oozing at the transition from esophagus to stomach,  injected with epinephrine. 2.  Acute onset of bleeding in a streaming pattern, consistent only with  portal hypertension, bands applied. 3.  Fundus full of blood. Cardia well seen without abnormalities. 4.  Not a lot of portal hypertensive gastropathy identified, and the  duodenum and papillae are normal.    INDICATIONS:  This is an elderly gentleman, 69years of age, been an  alcoholic. He has been evaluated on three separate occasions over the  course of the last year at [facility] with the first two describing  either an ulcer or portal hypertensive gastropathy and no varices. His  third endoscopy in 2021 showed sizeable varices with a red michelle spot  and four bands applied. He presented with several days of hematemesis  and dark stools and his hemoglobin was 5. He is now in the surgical  intensive care unit and the procedure was done at the bedside after  discussions with his power of . He is confused. Preop, monitored anesthesia care. DESCRIPTION OF PROCEDURE:  The patient was placed left lateral and  sedated.   The Olympus GIF-H190 L video colonoscope was introduced into  the cervical esophagus which was devoid of blood as was the mid  esophagus. There was a tiny, 1-mm area of ooze right at the  gastroesophageal transition. I really did not see anything I would say  is a varix. On entering the stomach, there was a sizeable clot of blood  filling the fundus. The cardia was well seen and there were no varices  at this level. The blood in the proximal stomach was actually old. The  body did not show typical changes of portal hypertension, and the antrum  was devoid of any residual ulcer and the proximal duodenum was normal.   The endoscope was withdrawn to the gastroesophageal junction. The area  was still oozing, and it did not appear to be a variceal bleeding  though. It was injected with three separate aliquots of 0.5 mL of  dilute epinephrine. Subsequently, bleeding erupted. It did not seem to  be exactly the same source of the previous oozing erosion which is all  it appeared to be. It was clearly of portal hypertensive origins. The  endoscope was withdrawn. A Jonas-Cook band ligator was attached and reintroduced. There was  blood in the area of gastroesophageal junction that needed cleared, but  the bleeding ceased spontaneously. I found a varix in the area where I  had injected it, and observed a whiteout, so band was applied. I passed  the endoscope adjacent to it, and again there seemed to be more of an  erosion than a varix but a band was placed in this area as well. There  was nothing else to band. Bleeding seemed to have ceased. The  endoscope was withdrawn. The band ligator was removed and the  instrument reintroduced. Bands were in place. Dry. No accumulated  blood. Terminated and well tolerated. EBL: minimal    IMPRESSION:  I would have not seen varices if it was not for the  streaming bleeding which could only have been variceal origins and  portal hypertension. There was a small erosion which was actively  oozing.   Right now, he is not bleeding, and I would keep him in the ICU  under observation for 24 hours and continue octreotide. He would have  to be considered for a TIPS if bleeding recurs because this may have  been the source of his bleeding on all three occasions previously.         Derrek Sánchez MD    D: 09/14/2021 20:41:46       T: 09/14/2021 88:92:72     [email or web address]  Job#: [id number]     Doc#: [id number]    CC:

## 2021-09-15 NOTE — PROGRESS NOTES
SURGICAL INTENSIVE CARE  PROGRESS NOTE    Date of admission:  9/14/2021  Reason for ICU transfer:  GI bleeding     HOSPITAL COURSE:  [id number] chronic alcoholic who has a history of hepatic encephalopathy as well as variceal bleeding patient came in with hemoptysis x1 day. He had 5 episodes of dark blood. He states that he drinks 2--20 ounce [place] hard lemonade a day. Currently he denies any abdominal pain. Patient had esophageal varices banded at [facility] in March 2021     9/15 underwent EGD with banding yesterday      PHYSICAL EXAM:  /68   Pulse 106   Temp 98.6 °F (37 °C) (Axillary)   Resp 17   Ht 5' 9\" (1.753 m)   Wt 142 lb 3.2 oz (64.5 kg)   SpO2 96%   BMI 21.00 kg/m²     GENERAL:  NAD. Alert and oriented   HEAD:  Normocephalic, atraumatic. EYES:   No scleral icterus. PERRLA. LUNGS:  No increased work of breathing. CTAB. CARDIOVASCULAR: tachycardic   ABDOMEN:  Soft, non-distended, non-tender. No guarding, rigidity, rebound. EXTREMITIES:   MAEx4. Atraumatic. No LE edema. SKIN:  Warm and dry      ASSESSMENT / PLAN:  Neuro:  No acute issues. Pain control prn. CV: has been stable, ok to remove arterial line and central line. Start home nadolol  Pulm: on room air. Encourage IS/SMI. GI: esophageal erosion and variceal bleeding. Required 3 total units PRBCs. Hemoglobin has been stable. [facility] for clear liquid diet   Renal: No acute issues. Monitor UOP & Electrolytes. Endocrine:  Monitor glucose to keep <180 in ICU  MSK: No acute issues. PT/OT.   Heme: daily labs  ID: continue rocephin for 7 days total     Pain/Analgesia: tylenol   Bowel regimen: none  DVT proph:  SCDs  GI proph:  protonix   Glucose protocol: none    Mouth/eye care: As needed per patient  Sanchez:   [facility] to remove  CVC sites:  Remove     Ancillary consults: Medicine, GI  Family Update: As available    Disposition: Continue ICU care    Allegra Salcedo MD  9/15/2021  5:27 AM

## 2021-09-15 NOTE — PLAN OF CARE
Problem: Pain:  Description: Pain management should include both nonpharmacologic and pharmacologic interventions.   Goal: Pain level will decrease  Description: Pain level will decrease  9/15/2021 0018 by Ting Dubon RN  Outcome: Met This Shift  9/14/2021 1805 by Lance Bustillo RN  Outcome: Met This Shift  Goal: Control of acute pain  Description: Control of acute pain  9/15/2021 0018 by Ting Dubon RN  Outcome: Met This Shift  9/14/2021 1805 by Lance Bustillo RN  Outcome: Met This Shift  Goal: Control of chronic pain  Description: Control of chronic pain  9/15/2021 0018 by Ting Dubon RN  Outcome: Met This Shift  9/14/2021 1805 by Lance Bustillo RN  Outcome: Met This Shift     Problem: Skin Integrity:  Goal: Will show no infection signs and symptoms  Description: Will show no infection signs and symptoms  9/15/2021 0018 by Ting Dubon RN  Outcome: Met This Shift  9/14/2021 1805 by Lance Bustillo RN  Outcome: Met This Shift  Goal: Absence of new skin breakdown  Description: Absence of new skin breakdown  9/15/2021 0018 by Ting uDbon RN  Outcome: Met This Shift  9/14/2021 1805 by Lance Bustillo RN  Outcome: Met This Shift     Problem: Falls - Risk of:  Goal: Will remain free from falls  Description: Will remain free from falls  9/15/2021 0018 by Ting Dubon RN  Outcome: Met This Shift  9/14/2021 1805 by Lance Bustillo RN  Outcome: Met This Shift  Goal: Absence of physical injury  Description: Absence of physical injury  9/15/2021 0018 by Ting Dubon RN  Outcome: Met This Shift  9/14/2021 1805 by Lance Bustillo RN  Outcome: Met This Shift

## 2021-09-15 NOTE — PROGRESS NOTES
[facility]  SURGICAL INTENSIVE CARE UNIT    CRITICAL CARE ATTENDING PROGRESS NOTE    I have examined the patient, reviewed the record, and discussed the case with the APN/  Resident. I have reviewed all relevant labs and imaging data. Please refer to the  APN/ resident's note. I agree with the  assessment and plan with the following corrections/ additions. The following summarizes my clinical findings and independent assessment. CC: Upper GI bleed    HOSPITAL COURSE:  [id number] chronic alcoholic who has a history of hepatic encephalopathy as well as variceal bleeding patient came in with hemoptysis x1 day. He had 5 episodes of dark blood. He states that he drinks 2--20 ounce [place] hard lemonade a day. Currently he denies any abdominal pain. Patient had esophageal varices banded at [facility] in March 2021    9/15 underwent EGD with banding yesterday    EXAM:  Cachectic and ill-appearing  Awake and alert  Breathing comfortably  Abdomen soft nontender tender nondistended reducible umbilical hernia      ASSESSMENT:  Active Problems:    GI bleed  Resolved Problems:    * No resolved hospital problems. *       PLAN:  Hemorrhagic shock/upper GI bleed  Likely secondary to bleeding varices  Underwent EGD with banding of esophageal varices with GI on 9/14  Hemoglobin up to 8.1  Stop octreotide drip  We will continue empiric Rocephin given cirrhosis due to alcoholism--SBP prophylaxis  Continue 40 mg IV Protonix twice daily  Resume nonselective beta-blockernadolol  Okay to start clears    Alcohol abuse multivitamin thiamine and folate    FEN: Chronic hyponatremiadue to alcoholism  Replete phosphorus    Endo: Monitor Blood Sugars. Target blood glucose less than 180 in the ICU.         DVT prophylaxis--SCDS, hold anticoagulation for now secondary bleeding  GI Prophylaxis--PPI  Lines--femoral triple-lumen catheter9/14--remove triple lumen  CODE: DNR CCA     DISPOSITION--transfer to [facility] floor    Cassie Marshall MD, FACS  9/15/2021  9:49 AM    NOTE: This report was transcribed using voice recognition software. Every effort was made to ensure accuracy; however, inadvertent computerized transcription errors may be present.

## 2021-09-15 NOTE — PROGRESS NOTES
Hospitalist Progress Note      Synopsis: Patient with a hx of chronic alcohol falure with a hx of hepatic encephalopathy and variceal bleeding who admitted on 9/14/2021 with hemoptysis x 1 day. Pt reports drinking about 40 ounces of [place] hard lemonade daily. Underwent EGD with banding on 9/15 with GI     Subjective  Reports that he hurts on his butt  Continues to have some confusion     Exam:  /65   Pulse 96   Temp 98.2 °F (36.8 °C) (Oral)   Resp 23   Ht 5' 9\" (1.753 m)   Wt 142 lb 3.2 oz (64.5 kg)   SpO2 98%   BMI 21.00 kg/m²   General appearance: Cachectic, ill appearing. HEENT: Pupils equal, round, and reactive to light. Conjunctivae/corneas clear. Neck: Supple. No jugular venous distention. Trachea midline. Respiratory:  Normal respiratory effort. Clear to auscultation, bilaterally without Rales/Wheezes/Rhonchi. Cardiovascular: Regular rate and rhythm with normal S1/S2 without murmurs, rubs or gallops. Abdomen: Soft, non-tender, non-distended with normal bowel sounds. Reducible umbilical hernia   Musculoskeletal: No clubbing, cyanosis or edema bilaterally. Brisk capillary refill. 2+ lower extremity pulses (dorsalis pedis).    Skin:  No rashes; some irritation in groin area   Neurologic: awake, alert and following commands     Medications:  Reviewed    Infusion Medications    sodium chloride Stopped (09/14/21 1033)    lactated ringers 100 mL/hr at 09/14/21 2203     Scheduled Medications    potassium & sodium phosphates  2 packet Oral 4x Daily    nadolol  20 mg Oral Daily    sodium chloride flush  5-40 mL IntraVENous 2 times per day    pantoprazole  40 mg IntraVENous BID    And    sodium chloride (PF)  10 mL IntraVENous BID    cefTRIAXone (ROCEPHIN) IV  1,000 mg IntraVENous Q24H    multivitamin  1 tablet Oral Daily    thiamine mononitrate  100 mg Oral Daily    folic acid  1 mg Oral Daily     PRN Meds: sodium chloride flush, sodium chloride, ondansetron **OR** ondansetron, acetaminophen **OR** acetaminophen    I/O    Intake/Output Summary (Last 24 hours) at 9/15/2021 1124  Last data filed at 9/15/2021 0800  Gross per 24 hour   Intake 2868. 16 ml   Output 1936 ml   Net 932.16 ml       Labs:   Recent Labs     09/14/21 0337 09/14/21  1105 09/14/21  1745 09/14/21  2340 09/15/21  0435   WBC 16.5*  --   --   --  12.1*   HGB 5.4*   < > 6.9* 8.5* 8.1*   HCT 19.8*   < > 22.3* 26.5* 25.7*     --   --   --  105*    < > = values in this interval not displayed. Recent Labs     09/14/21 0337 09/14/21  0830 09/14/21  2340 09/15/21  0435   *  --  134 134   K 3.4*  --  3.2* 4.1   CL 96*  --  107 108*   CO2 19*  --  19* 18*   BUN 27*  --  24* 23   CREATININE 0.9  --  0.9 0.8   CALCIUM 7.6*  --  8.1* 7.7*   PHOS  --  2.6  --  1.6*       Recent Labs     09/14/21  0337 09/15/21  0435   PROT 5.3* 4.5*   ALKPHOS 127 91   ALT 15 13   AST 41* 29   BILITOT 1.0 1.9*   LIPASE 38  --        Recent Labs     09/14/21  0830 09/15/21  0435   INR 2.3 1.7       No results for input(s): Russ Grossman in the last 72 hours. Chronic labs:  Lab Results   Component Value Date    INR 1.7 09/15/2021       Radiology:  Imaging studies reviewed today. ASSESSMENT:  Upper GI bleed with a hx of esophageal varices  Esophageal erosion and variceal bleeding  Hemoptysis  Acute blood loss anemia  Lactic acidosis  Hyponatremia  Leukocytosis    PLAN:  S/p EGD on 9/14 with banding done by GI  Follow Hgb levels  Now off octreotide  Continues on rocephin for SBP prophylaxis  Transfuse as indicated - received 3 total units of PRBC  Continue LR pending improved oral intake  CLD - ADAT  Strict I/O, monitor renal function  Activity as tolerated, PT/OT ordered        Diet: ADULT DIET;  Clear Liquid  Code Status: DNR-CCA  PT/OT Eval Status:   ordered  DVT Prophylaxis:   scd  Recommended disposition at discharge:  pending medical progression     +++++++++++++++++++++++++++++++++++++++++++++++++  Latonia Pederson MD   [facility].  +++++++++++++++++++++++++++++++++++++++++++++++++  NOTE: This report was transcribed using voice recognition software. Every effort was made to ensure accuracy; however, inadvertent computerized transcription errors may be present.

## 2021-09-16 VITALS
OXYGEN SATURATION: 100 % | TEMPERATURE: 98.2 F | HEIGHT: 69 IN | HEART RATE: 76 BPM | RESPIRATION RATE: 18 BRPM | SYSTOLIC BLOOD PRESSURE: 100 MMHG | WEIGHT: 142.2 LBS | DIASTOLIC BLOOD PRESSURE: 65 MMHG | BODY MASS INDEX: 21.06 KG/M2

## 2021-09-16 LAB
ALBUMIN SERPL-MCNC: 2 G/DL (ref 3.5–5.2)
ALP BLD-CCNC: 84 U/L (ref 40–129)
ALT SERPL-CCNC: 13 U/L (ref 0–40)
ANION GAP SERPL CALCULATED.3IONS-SCNC: 10 MMOL/L (ref 7–16)
AST SERPL-CCNC: 31 U/L (ref 0–39)
BASOPHILS ABSOLUTE: 0.08 E9/L (ref 0–0.2)
BASOPHILS RELATIVE PERCENT: 0.7 % (ref 0–2)
BILIRUB SERPL-MCNC: 0.8 MG/DL (ref 0–1.2)
BUN BLDV-MCNC: 13 MG/DL (ref 6–23)
CALCIUM IONIZED: 1.16 MMOL/L (ref 1.15–1.33)
CALCIUM SERPL-MCNC: 7.1 MG/DL (ref 8.6–10.2)
CHLORIDE BLD-SCNC: 106 MMOL/L (ref 98–107)
CO2: 15 MMOL/L (ref 22–29)
CREAT SERPL-MCNC: 0.7 MG/DL (ref 0.7–1.2)
EOSINOPHILS ABSOLUTE: 0.78 E9/L (ref 0.05–0.5)
EOSINOPHILS RELATIVE PERCENT: 6.6 % (ref 0–6)
GFR AFRICAN AMERICAN: >60
GFR NON-AFRICAN AMERICAN: >60 ML/MIN/1.73
GLUCOSE BLD-MCNC: 83 MG/DL (ref 74–99)
HCT VFR BLD CALC: 25.8 % (ref 37–54)
HEMOGLOBIN: 8 G/DL (ref 12.5–16.5)
IMMATURE GRANULOCYTES #: 0.21 E9/L
IMMATURE GRANULOCYTES %: 1.8 % (ref 0–5)
INR BLD: 1.7
LYMPHOCYTES ABSOLUTE: 2.39 E9/L (ref 1.5–4)
LYMPHOCYTES RELATIVE PERCENT: 20.1 % (ref 20–42)
MAGNESIUM: 1.9 MG/DL (ref 1.6–2.6)
MCH RBC QN AUTO: 25.5 PG (ref 26–35)
MCHC RBC AUTO-ENTMCNC: 31 % (ref 32–34.5)
MCV RBC AUTO: 82.2 FL (ref 80–99.9)
MONOCYTES ABSOLUTE: 1.04 E9/L (ref 0.1–0.95)
MONOCYTES RELATIVE PERCENT: 8.7 % (ref 2–12)
NEUTROPHILS ABSOLUTE: 7.4 E9/L (ref 1.8–7.3)
NEUTROPHILS RELATIVE PERCENT: 62.1 % (ref 43–80)
PDW BLD-RTO: 17.5 FL (ref 11.5–15)
PHOSPHORUS: 2.3 MG/DL (ref 2.5–4.5)
PLATELET # BLD: 152 E9/L (ref 130–450)
PMV BLD AUTO: 10.6 FL (ref 7–12)
POTASSIUM REFLEX MAGNESIUM: 3.8 MMOL/L (ref 3.5–5)
PROTHROMBIN TIME: 18.1 SEC (ref 9.3–12.4)
RBC # BLD: 3.14 E12/L (ref 3.8–5.8)
SODIUM BLD-SCNC: 131 MMOL/L (ref 132–146)
TOTAL PROTEIN: 4.4 G/DL (ref 6.4–8.3)
WBC # BLD: 11.9 E9/L (ref 4.5–11.5)

## 2021-09-16 PROCEDURE — 6370000000 HC RX 637 (ALT 250 FOR IP): Performed by: STUDENT IN AN ORGANIZED HEALTH CARE EDUCATION/TRAINING PROGRAM

## 2021-09-16 PROCEDURE — 97530 THERAPEUTIC ACTIVITIES: CPT

## 2021-09-16 PROCEDURE — 82330 ASSAY OF CALCIUM: CPT

## 2021-09-16 PROCEDURE — 97162 PT EVAL MOD COMPLEX 30 MIN: CPT

## 2021-09-16 PROCEDURE — 36415 COLL VENOUS BLD VENIPUNCTURE: CPT

## 2021-09-16 PROCEDURE — 2580000003 HC RX 258: Performed by: STUDENT IN AN ORGANIZED HEALTH CARE EDUCATION/TRAINING PROGRAM

## 2021-09-16 PROCEDURE — 6370000000 HC RX 637 (ALT 250 FOR IP): Performed by: SURGERY

## 2021-09-16 PROCEDURE — 99233 SBSQ HOSP IP/OBS HIGH 50: CPT | Performed by: SURGERY

## 2021-09-16 PROCEDURE — 85610 PROTHROMBIN TIME: CPT

## 2021-09-16 PROCEDURE — 84100 ASSAY OF PHOSPHORUS: CPT

## 2021-09-16 PROCEDURE — 6360000002 HC RX W HCPCS: Performed by: SURGERY

## 2021-09-16 PROCEDURE — 83735 ASSAY OF MAGNESIUM: CPT

## 2021-09-16 PROCEDURE — 97535 SELF CARE MNGMENT TRAINING: CPT

## 2021-09-16 PROCEDURE — 6360000002 HC RX W HCPCS: Performed by: STUDENT IN AN ORGANIZED HEALTH CARE EDUCATION/TRAINING PROGRAM

## 2021-09-16 PROCEDURE — C9113 INJ PANTOPRAZOLE SODIUM, VIA: HCPCS | Performed by: STUDENT IN AN ORGANIZED HEALTH CARE EDUCATION/TRAINING PROGRAM

## 2021-09-16 PROCEDURE — 2580000003 HC RX 258: Performed by: FAMILY MEDICINE

## 2021-09-16 PROCEDURE — 85025 COMPLETE CBC W/AUTO DIFF WBC: CPT

## 2021-09-16 PROCEDURE — 80053 COMPREHEN METABOLIC PANEL: CPT

## 2021-09-16 PROCEDURE — 97166 OT EVAL MOD COMPLEX 45 MIN: CPT

## 2021-09-16 RX ORDER — NADOLOL 20 MG/1
10 TABLET ORAL DAILY
Status: DISCONTINUED | OUTPATIENT
Start: 2021-09-16 | End: 2021-09-16 | Stop reason: HOSPADM

## 2021-09-16 RX ORDER — MAGNESIUM SULFATE IN WATER 40 MG/ML
2000 INJECTION, SOLUTION INTRAVENOUS ONCE
Status: COMPLETED | OUTPATIENT
Start: 2021-09-16 | End: 2021-09-16

## 2021-09-16 RX ORDER — NADOLOL 20 MG/1
10 TABLET ORAL DAILY
Qty: 30 TABLET | Refills: 3 | Status: SHIPPED | OUTPATIENT
Start: 2021-09-17

## 2021-09-16 RX ADMIN — FOLIC ACID 1 MG: 1 TABLET ORAL at 08:15

## 2021-09-16 RX ADMIN — POTASSIUM BICARBONATE 20 MEQ: 782 TABLET, EFFERVESCENT ORAL at 14:44

## 2021-09-16 RX ADMIN — SODIUM CHLORIDE, PRESERVATIVE FREE 10 ML: 5 INJECTION INTRAVENOUS at 08:16

## 2021-09-16 RX ADMIN — PANTOPRAZOLE SODIUM 40 MG: 40 INJECTION, POWDER, FOR SOLUTION INTRAVENOUS at 08:15

## 2021-09-16 RX ADMIN — CEFTRIAXONE 1000 MG: 1 INJECTION, POWDER, FOR SOLUTION INTRAMUSCULAR; INTRAVENOUS at 08:30

## 2021-09-16 RX ADMIN — NADOLOL 10 MG: 20 TABLET ORAL at 08:16

## 2021-09-16 RX ADMIN — SODIUM CHLORIDE, PRESERVATIVE FREE 10 ML: 5 INJECTION INTRAVENOUS at 08:15

## 2021-09-16 RX ADMIN — Medication 1 TABLET: at 08:16

## 2021-09-16 RX ADMIN — Medication 100 MG: at 08:16

## 2021-09-16 RX ADMIN — Medication 500 MG: at 08:15

## 2021-09-16 RX ADMIN — Medication 500 MG: at 14:43

## 2021-09-16 RX ADMIN — MAGNESIUM SULFATE HEPTAHYDRATE 2000 MG: 40 INJECTION, SOLUTION INTRAVENOUS at 08:24

## 2021-09-16 ASSESSMENT — PAIN SCALES - GENERAL
PAINLEVEL_OUTOF10: 0

## 2021-09-16 NOTE — CARE COORDINATION
Pt at bedside. He lives with his significant other, Mimi Sim in a 1 story home with 2 steps to enter. He is independent in adl's, including driving. Pt uses [facility] for pharmacy. Plan is to return home at discharge. Pt states Genesis Abdul will transport him home.  She works during the day, but will be available after 4pm.

## 2021-09-16 NOTE — PROGRESS NOTES
A/O  Transfer pending  Says much better than yesterday  No nausea or vomiting  Stools small volume and bloody but Hct stable  Octreotide off    Continue same

## 2021-09-16 NOTE — PROGRESS NOTES
DISPOSITION--transfer to monitored floor    Karolina Lassiter MD, FACS  9/16/2021  7:50 AM    NOTE: This report was transcribed using voice recognition software. Every effort was made to ensure accuracy; however, inadvertent computerized transcription errors may be present.

## 2021-09-16 NOTE — PROGRESS NOTES
with functional transfers/mobility and ADLs  * Therapeutic exercise to improve motor endurance, ROM, and functional strength for ADLs/functional transfers  * Therapeutic activities to facilitate/challenge dynamic balance, stand tolerance for increased safety and independence with ADLs    Recommended Adaptive Equipment: TBD     Home Living: Pt lives with friend in a 1 floor house with 2.5 ARYA(1 handrail); bed/bath on 1st floor   Bathroom setup: walk-in shower with grab bars or tub/shower   Equipment owned: shower chair    Prior Level of Function: Independent with ADLs , Independent with IADLs; ambulated w/ no AD  Driving: Yes  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[employer])    Pain Level: Pt reports 0/10 pain this session  Cognition: A&O: 4/4; Follows 1-2 step directions   Memory:  good   Sequencing:  Fair+   Problem solving:  fair   Judgement/safety:  fair    RASS: 0  CAM-ICU: (NT) Delirium     Functional Assessment:  AM-PAC Daily Activity Raw Score: 19/24   Initial Eval Status  Date: 9/16//21 Treatment Status  Date: STGs = LTGs  Time frame: 10-14 days   Feeding Independent       Grooming Stand by Assist (seated in bedside chair)  Modified [place]    UB Dressing Minimal Assist to don/doff gown  Modified [place]    LB Dressing Minimal Assist to don/doff socks utilizing cross over leg technique  Modified [place]    Bathing Minimal Assist (simulated)  Modified [place]    Toileting Minimal Assist (pt incontinent of BM multiple times during session, assistance for balance in standing for back natacha-area hygiene)  Modified [place]    Bed Mobility  Supine to sit: Minimal Assist   Sit to supine: NT  Supine to sit: Independent   Sit to supine: Independent    Functional Transfers Sit<>Stand EOB, bedside chair: Minimal Assist   Modified [place]    Functional Mobility Mod. A w/ no AD (to/from doorway, limited d/t pt incontinent of BM)  Modified [place]    Balance Sitting:     Dynamic: Min. A  Standing: Mod. A w/ no AD  Sitting:     Dynamic: Indep  Standing: Mod. I   Activity Tolerance fair  good   Visual/  Perceptual Glasses: No    WFL                Hand Dominance R   AROM (PROM) Strength Additional Info:    RUE  WFL 4-/5 good  and wfl FMC/dexterity noted during ADL tasks       LUE WFL 4-/5 good  and wfl FMC/dexterity noted during ADL tasks       Hearing: WFL   Sensation:  No c/o numbness or tingling   Tone: WFL   Edema: L ankle swollen      Vitals:   HR at rest: 82 bpm HR at end of session: 86 bpm   Spo2 at rest:96% Spo2 at end of session 96%   BP at rest:96/54 mmHg BP at end of session 82/70 mmHg       Comments: OK from RN to see patient. Upon arrival, patient lying in bed. Pt demo fair tolerance with fair+ understanding of education/techniques. At end of session, patient seated in bedside chair eating breakfast.  Call light within reach, all lines and tubes intact. Pt instructed on use of call light for assistance and fall prevention. Line management and environmental modifications made prior to and end of session to ensure patient safety and to increase efficiency of session. Skilled monitoring of HR, O2 saturation, blood pressure and patient's response to activity performed throughout session. Overall, pt presents with decreased activity tolerance, dynamic balance, functional mobility limiting completion of ADLs and safe return home. Pt can benefit from continued skilled OT to increase safety and functional independence. Treatment:   · Bed mobility: Facilitated bed mobility with cues for proper body mechanics and sequencing to prepare for ADL completion. · Functional transfers: Facilitated transfers from EOB, bedside chair(sit<>stand) with cues for body alignment, safety and hand placement. · ADL completion: Self-care retraining for the above-mentioned ADLs; training on proper hand placement, safety technique, sequencing, and energy conservation techniques.  Completed seated in bedside chair-light grooming tasks. · Postural Balance: Sitting and/or standing balance retraining to improve righting reactions with postural changes during ADLS. Assistance of 2 required for safety d/t pt's medical complexity, line management and pt's deconditioned. Rehab Potential: Good for established goals     Patient / Family Goal: not stated      Patient and/or family were instructed on functional diagnosis, prognosis/goals and OT plan of care. Demonstrated good understanding. Eval Complexity: Mod  · History: Expanded chart review of consults, imaging, and psychosocial history related to current functional performance. · Exam: 5+ performance deficits identified limiting functional independence and safe return home   · Assistance/Modification: Min/mod assistance or modifications required to perform tasks. May have comorbidities that affect occupational performance. Time In: 8:10  Time Out: 8:45  Total Treatment Time: 23 minutes    Min Units   OT Eval Low 74597       OT Eval Medium 97166  x 1   OT Eval High 33976       OT Re-Eval [id number]       Therapeutic Ex [id number]       Therapeutic Activities 67230  8  1   ADL/Self Care 53266  15  1   Orthotic Management 63755       Neuro Re-Ed [id number]       Non-Billable Time          Evaluation Time includes thorough review of current medical information, gathering information on past medical history/social history and prior level of function, completion of standardized testing/informal observation of tasks, assessment of data and education on plan of care and goals.           Araceli Steele OTR/L #[id number]

## 2021-09-16 NOTE — PROGRESS NOTES
Physical Therapy  Physical Therapy Initial Assessment     Name: Anamika Coker  : 1950  MRN: [id number]      Date of Service: 2021    Evaluating PT:  Dora Kohli, PT, DPT [id number]    Room #:  9334/8922-K  Diagnosis:  GI bleed [K92.2]  Gastrointestinal hemorrhage, unspecified gastrointestinal hemorrhage type [K92.2]  Hepatic cirrhosis, unspecified hepatic cirrhosis type, unspecified whether ascites present ([facility] [address] [K74.60]  PMHx/PSHx:  NA  Procedure/Surgery:  9/15 EGD with banding   Precautions:  Falls, Incontinent  Equipment Needs:  TBD    SUBJECTIVE:    Pt lives with a friend in a 1 story home with 2 stairs to enter and 1 rail. Pt ambulated without device and was independent PTA. OBJECTIVE:   Initial Evaluation  Date: 21 Treatment Short Term/ Long Term   Goals   AM-PAC 6 Clicks      Was pt agreeable to Eval/treatment? Yes     Does pt have pain? No c/o pain     Bed Mobility  Rolling: NT  Supine to sit: Ileana with HOB elevated  Sit to supine: NT  Scooting: Ileana  Mod Independent   Transfers Sit to stand: Ileana  Stand to sit: Ileana  Stand pivot: ModA no device  Mod Independent with AAD   Ambulation   20 feet with ModA no device  >150 feet with Mod Independent with AAD   Stair negotiation: ascended and descended NT  >4 steps with 1 rail Mod Independent   ROM BUE:  Defer to OT note  BLE:  WFL     Strength BUE:  Defer to OT note  BLE:  4/5  Increase by 1/3 MMT grade   Balance Sitting EOB:  Ileana  Dynamic Standing:  ModA no device  Sitting EOB:  Independent  Dynamic Standing:   Mod Independent with AAD     Pt is A & O x 4  CAM-ICU: NT  RASS: 0  Sensation:  No reported paresthesias  Edema:  None    Vitals:  Heart Rate at rest 75 bpm Heart Rate post session 83 bpm   SpO2 at rest 96% SpO2 post session 97%   Blood Pressure at rest 98/54 mmHg Blood Pressure post session 82/70 mmHg       Functional Status Score-Intensive Care Unit (FSS-ICU)   Rolling -/7   Supine to sit transfer 3/7   Unsupported sitting  4/7   Sit to stand transfers 4/7   Ambulation 1/7   Total  12/35       Therapeutic Exercises:  NA    Patient education  Pt educated on safety    Patient response to education:   Pt verbalized understanding Pt demonstrated skill Pt requires further education in this area   x x x     ASSESSMENT:    Conditions Requiring Skilled Therapeutic Intervention:    [x]Decreased strength     []Decreased ROM  [x]Decreased functional mobility  [x]Decreased balance   [x]Decreased endurance   [x]Decreased posture  []Decreased sensation  []Decreased coordination   []Decreased vision  [x]Decreased safety awareness   []Increased pain       Comments:  RN reported pt was stable for session. Pt was in bed upon arrival, agreeable to initial evaluation. Trunk assistance provided for bed mobility. Slight lightheadedness reported with upright activity but improved with time. Pt was incontinent of bowel upon standing and was assisted to chair. Pt was then placed on bedpan. Once finished, pt completed short distance ambulation in room with unsteadiness but was limited by persistent incontinence. Pt was left in chair with all needs met and call light in reach. All lines remained intact. RN notified. Treatment:  Patient practiced and was instructed in the following treatment:     Bed mobility training - pt given verbal and tactile cues to facilitate proper sequencing and safety during supine>sit as well as provided with physical assistance.  Sitting EOB for >5 minutes for upright tolerance, postural awareness and BLE ROM   Transfer training - pt was given verbal and tactile cues to facilitate proper hand placement, technique and safety during sit to stand, stand to sit and stand pivot transfers as well as provided with physical assistance.  Gait training- pt was given verbal and tactile cues to facilitate safety and balance during ambulation as well as provided with physical assistance. Pt's/ family goals   1.  Return home    Prognosis is good for reaching above PT goals. Patient and or family understand(s) diagnosis, prognosis, and plan of care. yes    PHYSICAL THERAPY PLAN OF CARE:    PT POC is established based on physician order and patient diagnosis     Referring provider/PT Order:  Narda Tovar MD /09/15/21 1145 PT eval and treat  Diagnosis:  GI bleed [K92.2]  Gastrointestinal hemorrhage, unspecified gastrointestinal hemorrhage type [K92.2]  Hepatic cirrhosis, unspecified hepatic cirrhosis type, unspecified whether ascites present ([facility] [address] [K74.60]  Specific instructions for next treatment:  Progress activity    Current Treatment Recommendations:     [x] Strengthening to improve independence with functional mobility   [] ROM to improve independence with functional mobility   [x] Balance Training to improve static/dynamic balance and to reduce fall risk  [x] Endurance Training to improve activity tolerance during functional mobility   [x] Transfer Training to improve safety and independence with all functional transfers   [x] Gait Training to improve gait mechanics, endurance and asses need for appropriate assistive device  [x] Stair Training in preparation for safe discharge home and/or into the community   [] Positioning to prevent skin breakdown and contractures  [x] Safety and Education Training   [x] Patient/Caregiver Education   [] HEP  [] Other     PT long term treatment goals are located in above grid    Frequency of treatments: 2-5x/week x 1-2 weeks. Time in  0800  Time out  0835    Total Treatment Time  25 minutes     Evaluation Time includes thorough review of current medical information, gathering information on past medical history/social history and prior level of function, completion of standardized testing/informal observation of tasks, assessment of data and education on plan of care and goals.     CPT codes:  [] Low Complexity PT evaluation 79983  [x] Moderate Complexity PT evaluation 29462  [] High Complexity PT evaluation [id number]  [] PT Re-evaluation [id number]  [] Gait training 99469 - minutes  [] Manual therapy 90236 - minutes  [x] Therapeutic activities 71050 25 minutes  [] Therapeutic exercises 17463 - minutes  [] Neuromuscular reeducation 18673 - minutes     Palma Hatchet, PT, DPT  [id number]

## 2021-09-16 NOTE — PLAN OF CARE
Problem: Pain:  Description: Pain management should include both nonpharmacologic and pharmacologic interventions.   Goal: Pain level will decrease  Description: Pain level will decrease  9/16/2021 0310 by Corry Condon RN  Outcome: Met This Shift  9/15/2021 1608 by Oscar Lopez RN  Outcome: Met This Shift  Goal: Control of acute pain  Description: Control of acute pain  9/16/2021 0310 by Corry Condon RN  Outcome: Met This Shift  9/15/2021 1608 by Oscar Lopez RN  Outcome: Met This Shift  Goal: Control of chronic pain  Description: Control of chronic pain  Outcome: Met This Shift     Problem: Skin Integrity:  Goal: Will show no infection signs and symptoms  Description: Will show no infection signs and symptoms  9/16/2021 0310 by Corry Condon RN  Outcome: Met This Shift  9/15/2021 1608 by Oscar Lopez RN  Outcome: Met This Shift  Goal: Absence of new skin breakdown  Description: Absence of new skin breakdown  9/16/2021 0310 by Corry Condon RN  Outcome: Met This Shift  9/15/2021 1608 by Oscar Lopez RN  Outcome: Met This Shift     Problem: Falls - Risk of:  Goal: Will remain free from falls  Description: Will remain free from falls  9/16/2021 0310 by Corry Condon RN  Outcome: Met This Shift  9/15/2021 1608 by Oscar Lopez RN  Outcome: Met This Shift  Goal: Absence of physical injury  Description: Absence of physical injury  9/16/2021 0310 by Corry Condon RN  Outcome: Met This Shift  9/15/2021 1608 by Oscar Lopez RN  Outcome: Met This Shift

## 2021-09-16 NOTE — PROGRESS NOTES
Hospitalist Progress Note      SYNOPSIS:    Patient with a hx of chronic alcohol falure with a hx of hepatic encephalopathy and variceal bleeding who admitted on 2021 with hemoptysis x 1 day. Pt reports drinking about 40 ounces of Sumit hard lemonade daily. Underwent EGD with banding on 9/15 with GI        SUBJECTIVE:    Patient seen and examined  Records reviewed. Pt is eating breakfast this AM. And states he is ready to go home. Denies any complaints and no more bloody emesis. Stable overnight. No other overnight issues reported. Temp (24hrs), Av.3 °F (36.8 °C), Min:98 °F (36.7 °C), Max:98.6 °F (37 °C)    DIET: ADULT DIET; Regular; [facility] [place] (GERD/Peptic Ulcer)  CODE: DNR-CCA    Intake/Output Summary (Last 24 hours) at 2021 1447  Last data filed at 2021 0824  Gross per 24 hour   Intake 1170 ml   Output 904 ml   Net 266 ml       OBJECTIVE:    /65   Pulse 76   Temp 98.2 °F (36.8 °C) (Oral)   Resp 18   Ht 5' 9\" (1.753 m)   Wt 142 lb 3.2 oz (64.5 kg)   SpO2 100%   BMI 21.00 kg/m²     General appearance: Cachectic, ill appearing. HEENT: Conjunctivae/corneas clear. Neck: Supple. No jugular venous distention. Trachea midline. Respiratory:  Normal respiratory effort. Clear to auscultation, bilaterally without Rales/Wheezes/Rhonchi. Cardiovascular: Regular rate and rhythm   Abdomen: Soft, non-tender, non-distended with normal bowel sounds. Reducible umbilical hernia   Musculoskeletal: No clubbing, cyanosis or edema bilaterally. Skin:  No rashes  Neurologic: awake, alert and following commands     ASSESSMENT:    Upper GI bleed with a hx of esophageal varices  Esophageal erosion and variceal bleeding  Hemoptysis  Acute blood loss anemia  Lactic acidosis  Hyponatremia  Leukocytosis     PLAN:    S/p EGD on  with banding   Monitor H&H  Continues on rocephin for SBP prophylaxis  Cont.  Nadolol  Transfuse as indicated - received 3 total units of PRBC  Continue LR pending improved oral intake  Advance diet as tolerated. Strict I/O, monitor renal function  Activity as tolerated  PT/OT    DISPOSITION:     Medications:  REVIEWED DAILY    Infusion Medications    sodium chloride Stopped (09/14/21 1033)     Scheduled Medications    nadolol  10 mg Oral Daily    potassium & sodium phosphates  2 packet Oral 4x Daily    sodium chloride flush  5-40 mL IntraVENous 2 times per day    pantoprazole  40 mg IntraVENous BID    And    sodium chloride (PF)  10 mL IntraVENous BID    cefTRIAXone (ROCEPHIN) IV  1,000 mg IntraVENous Q24H    multivitamin  1 tablet Oral Daily    thiamine mononitrate  100 mg Oral Daily    folic acid  1 mg Oral Daily     PRN Meds: white petrolatum, sodium chloride flush, sodium chloride, ondansetron **OR** ondansetron, acetaminophen **OR** acetaminophen    Labs:     Recent Labs     09/14/21 0337 09/14/21  1105 09/15/21  0435 09/15/21  1100 09/16/21  0325   WBC 16.5*  --  12.1*  --  11.9*   HGB 5.4*   < > 8.1* 8.0* 8.0*   HCT 19.8*   < > 25.7* 25.3* 25.8*     --  105*  --  152    < > = values in this interval not displayed. Recent Labs     09/14/21 0337 09/14/21 0830 09/14/21  2340 09/15/21  0435 09/16/21  0325   NA   < >  --  134 134 131*   K  --   --  3.2* 4.1 3.8   CL   < >  --  107 108* 106   CO2   < >  --  19* 18* 15*   BUN   < >  --  24* 23 13   CREATININE   < >  --  0.9 0.8 0.7   CALCIUM   < >  --  8.1* 7.7* 7.1*   PHOS  --  2.6  --  1.6* 2.3*    < > = values in this interval not displayed. Recent Labs     09/14/21  0337 09/15/21  0435 09/16/21  0325   PROT 5.3* 4.5* 4.4*   ALKPHOS 127 91 84   ALT 15 13 13   AST 41* 29 31   BILITOT 1.0 1.9* 0.8   LIPASE 38  --   --        Recent Labs     09/14/21  0830 09/15/21  0435 09/16/21  0325   INR 2.3 1.7 1.7       No results for input(s): [place] in the last 72 hours.     Chronic labs:    Lab Results   Component Value Date    INR 1.7 09/16/2021       Radiology: REVIEWED DAILY    +++++++++++++++++++++++++++++++++++++++++++++++++  Darlene Corral MD  [facility] Physician - [address] [place] [address]  +++++++++++++++++++++++++++++++++++++++++++++++++  NOTE: This report was transcribed using voice recognition software. Every effort was made to ensure accuracy; however, inadvertent computerized transcription errors may be present.

## 2021-09-17 NOTE — DISCHARGE SUMMARY
Hospitalist Discharge Summary    Patient ID: Himanshu Buitrago   Patient : 1950  Patient's PCP: Laura Jaramillo DO    Admit Date: 2021   Admitting Physician: Tl Mcfarlane DO    Discharge Date:  2021   Discharge Physician: Guero Abebe MD   Discharge Condition: Stable  Discharge Disposition: [facility] course in brief:  (Please refer to daily progress notes for a comprehensive review of the hospitalization by requesting medical records)    Patient with a hx of chronic alcohol falure with a hx of hepatic encephalopathy and variceal bleeding who admitted on 2021 with hemoptysis x 1 day. Pt reports drinking about 40 ounces of Sumit hard lemonade daily. Underwent EGD with banding on 9/15 with GI. Pt received 3 units of pRBCs. The pt was also given Ceftriaxone for SBP prophylaxis. Pt was give IV fluids as well. Diet was advanced and the patient tolerated the diet. No further episodes of hemoptysis. The pt worked with Kristi [place] and is stable for discharge home. Consults:   IP CONSULT TO INTERNAL MEDICINE  IP CONSULT TO GI  IP CONSULT TO GI    Discharge Diagnoses:    Hemorrhagic shock/upper GI bleed   secondary to bleeding varices  Alcohol abuse  Hyponatremia      Discharge Instructions / Follow up:    PCP in a week. GI in two weeks.      Continued appropriate risk factor modification of blood pressure, diabetes and serum lipids will remain essential to reducing risk of future atherosclerotic development    Activity: activity as tolerated    Significant labs:  CBC:   Recent Labs     09/15/21  0435 09/15/21  1100 21  0325   WBC 12.1*  --  11.9*   RBC 3.18*  --  3.14*   HGB 8.1* 8.0* 8.0*   HCT 25.7* 25.3* 25.8*   MCV 80.8  --  82.2   RDW 16.7*  --  17.5*   *  --  152     BMP:   Recent Labs     21  2340 09/15/21  0435 21  0325    134 131*   K 3.2* 4.1 3.8    108* 106   CO2 19* 18* 15*   BUN 24* 23 13   CREATININE 0.9 0.8 0.7   MG 2.7* 2.5 1.9   PHOS --  1.6* 2.3*     LFT:  Recent Labs     09/15/21  0435 09/16/21  0325   PROT 4.5* 4.4*   ALKPHOS 91 84   ALT 13 13   AST 29 31   BILITOT 1.9* 0.8     PT/INR:   Recent Labs     09/15/21  0435 09/16/21  0325   INR 1.7 1.7     BNP: No results for input(s): BNP in the last 72 hours. Hgb A1C: No results found for: LABA1C  Folate and B12: No results found for: [id number], No results found for: FOLATE  Thyroid Studies: No results found for: TSH, Z3XJPDV, E9YTKZR, THYROIDAB    Urinalysis:    Lab Results   Component Value Date    NITRU Negative 09/14/2021    BLOODU Negative 09/14/2021    SPECGRAV 1.015 09/14/2021    GLUCOSEU Negative 09/14/2021       Imaging:  CT ABDOMEN PELVIS WO CONTRAST Additional Contrast? None    Result Date: 9/14/2021  EXAMINATION: CT OF THE ABDOMEN AND PELVIS WITHOUT CONTRAST 9/14/2021 2:46 am TECHNIQUE: CT of the abdomen and pelvis was performed without the administration of intravenous contrast. Multiplanar reformatted images are provided for review. Dose modulation, iterative reconstruction, and/or weight based adjustment of the mA/kV was utilized to reduce the radiation dose to as low as reasonably achievable. COMPARISON: 03/13/2021 HISTORY: ORDERING SYSTEM PROVIDED HISTORY: abdominal pain TECHNOLOGIST PROVIDED HISTORY: Reason for exam:->abdominal pain Additional Contrast?->None Decision Support Exception - unselect if not a suspected or confirmed emergency medical condition->Emergency Medical Condition (MA) What reading provider will be dictating this exam?->CRC FINDINGS: Lower Chest: The visualized portions of the lung bases are clear. Organs: The liver appears cirrhotic. Within the limitations of a noncontrast exam, the visualized spleen, adrenal glands and kidneys demonstrate no significant abnormality. There is some mild infiltration adjacent to pancreatic head. I cannot exclude very mild acute pancreatitis. GI/Bowel: There are no findings of intestinal obstruction.   There is some wall thickening involving jejunal loops of bowel on the left side of the abdomen. There is mild adjacent edema in the mesentery. These findings are concerning for enteritis. The appendix is normal.  There is sigmoid diverticulosis. There is no diverticulitis seen. There is cholelithiasis. Pelvis:  Bladder is unremarkable in appearance. There is no abnormal pelvic mass or fluid collection seen. Peritoneum/Retroperitoneum: There are aortoiliac atherosclerotic calcification. There is no abdominal aortic aneurysm. There is mild ascites adjacent to the liver. There is no free intraperitoneal air. Bones/Soft Tissues: No acute abnormality. 1. Cirrhotic liver with very mild ascites. 2. Cholelithiasis. 3. Abnormal wall thickening involving jejunal bowel loops. This is suggestive of enteritis. 4. Minimal infiltration around pancreatic head. This could be very mild acute pancreatitis. Correlate clinically. 5. Diverticulosis. No acute diverticulitis seen. CT HEAD WO CONTRAST    Result Date: 9/14/2021  EXAMINATION: CT OF THE HEAD WITHOUT CONTRAST; CT OF THE CERVICAL SPINE WITHOUT CONTRAST 9/14/2021 2:46 am TECHNIQUE: CT of the head was performed without the administration of intravenous contrast. Dose modulation, iterative reconstruction, and/or weight based adjustment of the mA/kV was utilized to reduce the radiation dose to as low as reasonably achievable.; CT of the cervical spine was performed without the administration of intravenous contrast. Multiplanar reformatted images are provided for review. Dose modulation, iterative reconstruction, and/or weight based adjustment of the mA/kV was utilized to reduce the radiation dose to as low as reasonably achievable. COMPARISON: None. HISTORY: ORDERING SYSTEM PROVIDED HISTORY: fall, ams TECHNOLOGIST PROVIDED HISTORY: Reason for exam:->fall, ams Has a \"code stroke\" or \"stroke alert\" been called? ->No Decision Support Exception - unselect if not a suspected or confirmed emergency medical condition->Emergency Medical Condition (MA) What reading provider will be dictating this exam?->CRC FINDINGS: CERVICAL SPINE: BONES/ALIGNMENT: There is no acute fracture or traumatic malalignment. DEGENERATIVE CHANGES: Multilevel degenerative changes are noted throughout the cervical spine. SOFT TISSUES: There is no prevertebral soft tissue swelling. HEAD: BRAIN/VENTRICLES: There is no acute intracranial hemorrhage, mass effect or midline shift. No abnormal extra-axial fluid collection. The gray-white differentiation is maintained without evidence of an acute infarct. There is no evidence of hydrocephalus. Chronic and age related changes including age-appropriate cerebral volume loss and scattered nonspecific white matter hypodensities which are likely the sequela of chronic small vessel ischemic disease. ORBITS: The visualized portion of the orbits demonstrate no acute abnormality. SINUSES: The visualized paranasal sinuses and mastoid air cells demonstrate no acute abnormality. SOFT TISSUES/SKULL: No acute abnormality of the visualized skull or soft tissues. No CT evidence of an acute intracranial abnormality. No evidence of acute fracture or traumatic malalignment of the cervical spine. Chronic and age related changes the head and cervical spine, as noted above. CT CERVICAL SPINE WO CONTRAST    Result Date: 9/14/2021  EXAMINATION: CT OF THE HEAD WITHOUT CONTRAST; CT OF THE CERVICAL SPINE WITHOUT CONTRAST 9/14/2021 2:46 am TECHNIQUE: CT of the head was performed without the administration of intravenous contrast. Dose modulation, iterative reconstruction, and/or weight based adjustment of the mA/kV was utilized to reduce the radiation dose to as low as reasonably achievable.; CT of the cervical spine was performed without the administration of intravenous contrast. Multiplanar reformatted images are provided for review.  Dose modulation, iterative reconstruction, and/or weight based adjustment of the mA/kV was utilized to reduce the radiation dose to as low as reasonably achievable. COMPARISON: None. HISTORY: ORDERING SYSTEM PROVIDED HISTORY: fall, ams TECHNOLOGIST PROVIDED HISTORY: Reason for exam:->fall, ams Has a \"code stroke\" or \"stroke alert\" been called? ->No Decision Support Exception - unselect if not a suspected or confirmed emergency medical condition->Emergency Medical Condition (MA) What reading provider will be dictating this exam?->CRC FINDINGS: CERVICAL SPINE: BONES/ALIGNMENT: There is no acute fracture or traumatic malalignment. DEGENERATIVE CHANGES: Multilevel degenerative changes are noted throughout the cervical spine. SOFT TISSUES: There is no prevertebral soft tissue swelling. HEAD: BRAIN/VENTRICLES: There is no acute intracranial hemorrhage, mass effect or midline shift. No abnormal extra-axial fluid collection. The gray-white differentiation is maintained without evidence of an acute infarct. There is no evidence of hydrocephalus. Chronic and age related changes including age-appropriate cerebral volume loss and scattered nonspecific white matter hypodensities which are likely the sequela of chronic small vessel ischemic disease. ORBITS: The visualized portion of the orbits demonstrate no acute abnormality. SINUSES: The visualized paranasal sinuses and mastoid air cells demonstrate no acute abnormality. SOFT TISSUES/SKULL: No acute abnormality of the visualized skull or soft tissues. No CT evidence of an acute intracranial abnormality. No evidence of acute fracture or traumatic malalignment of the cervical spine. Chronic and age related changes the head and cervical spine, as noted above. XR CHEST PORTABLE    Result Date: 9/14/2021  EXAMINATION: ONE XRAY VIEW OF THE CHEST 9/14/2021 2:42 am COMPARISON: None.  HISTORY: ORDERING SYSTEM PROVIDED HISTORY: hemoptysis TECHNOLOGIST PROVIDED HISTORY: Reason for exam:->hemoptysis What reading provider will be dictating this exam?->CRC FINDINGS: The lungs are without acute focal process. There is no effusion or pneumothorax. The cardiomediastinal silhouette is without acute process. The osseous structures are without acute process. No acute process. Discharge Medications:      Medication List      CHANGE how you take these medications    nadolol 20 MG tablet  Commonly known as: CORGARD  Take 0.5 tablets by mouth daily  What changed: how much to take        CONTINUE taking these medications    doxepin 10 MG capsule  Commonly known as: SINEQUAN     fluticasone 27.5 MCG/SPRAY nasal spray  Commonly known as: VERAMYST     MULTIVITAMIN ADULTS PO     omeprazole 40 MG delayed release capsule  Commonly known as: PRILOSEC     VITAMIN B-1 PO        STOP taking these medications    predniSONE 10 MG tablet  Commonly known as: Armin            Where to Get Your Medications      These medications were sent to [address], [address] [place] [address]  [address] [facility], [address] [place] [address]    Phone: [phone]   · nadolol 20 MG tablet         Time Spent on discharge is more than 45 minutes in the examination, evaluation, counseling and review of medications and discharge plan.    +++++++++++++++++++++++++++++++++++++++++++++++++  MD JUAN DIEGO Jules/ [address]  +++++++++++++++++++++++++++++++++++++++++++++++++  NOTE: This report was transcribed using voice recognition software. Every effort was made to ensure accuracy; however, inadvertent computerized transcription errors may be present.

## 2021-10-18 ENCOUNTER — HOSPITAL ENCOUNTER (EMERGENCY)
Age: 71
Discharge: HOME | End: 2021-10-18
Payer: MEDICARE

## 2021-10-18 VITALS
BODY MASS INDEX: 22.22 KG/M2 | TEMPERATURE: 99.2 F | WEIGHT: 150 LBS | OXYGEN SATURATION: 99 % | HEIGHT: 69 IN | SYSTOLIC BLOOD PRESSURE: 105 MMHG | HEART RATE: 94 BPM | DIASTOLIC BLOOD PRESSURE: 66 MMHG | RESPIRATION RATE: 20 BRPM

## 2021-10-18 LAB
ALBUMIN SERPL-MCNC: 2.5 G/DL (ref 3.5–5.2)
ALP BLD-CCNC: 206 U/L (ref 40–129)
ALT SERPL-CCNC: 36 U/L (ref 0–40)
ANION GAP SERPL CALCULATED.3IONS-SCNC: 11 MMOL/L (ref 7–16)
ANISOCYTOSIS: ABNORMAL
AST SERPL-CCNC: 100 U/L (ref 0–39)
BASOPHILS ABSOLUTE: 0 E9/L (ref 0–0.2)
BASOPHILS RELATIVE PERCENT: 0.1 % (ref 0–2)
BILIRUB SERPL-MCNC: 1.4 MG/DL (ref 0–1.2)
BUN BLDV-MCNC: 10 MG/DL (ref 6–23)
BURR CELLS: ABNORMAL
CALCIUM SERPL-MCNC: 8.1 MG/DL (ref 8.6–10.2)
CHLORIDE BLD-SCNC: 94 MMOL/L (ref 98–107)
CO2: 31 MMOL/L (ref 22–29)
CREAT SERPL-MCNC: 1.1 MG/DL (ref 0.7–1.2)
EOSINOPHILS ABSOLUTE: 0 E9/L (ref 0.05–0.5)
EOSINOPHILS RELATIVE PERCENT: 0.8 % (ref 0–6)
GFR AFRICAN AMERICAN: >60
GFR NON-AFRICAN AMERICAN: >60 ML/MIN/1.73
GLUCOSE BLD-MCNC: 105 MG/DL (ref 74–99)
HCT VFR BLD CALC: 30.5 % (ref 37–54)
HEMOGLOBIN: 9.5 G/DL (ref 12.5–16.5)
HYPOCHROMIA: ABNORMAL
LACTIC ACID: 3.1 MMOL/L (ref 0.5–2.2)
LIPASE: 59 U/L (ref 13–60)
LYMPHOCYTES ABSOLUTE: 0.17 E9/L (ref 1.5–4)
LYMPHOCYTES RELATIVE PERCENT: 0.9 % (ref 20–42)
MCH RBC QN AUTO: 27.2 PG (ref 26–35)
MCHC RBC AUTO-ENTMCNC: 31.1 % (ref 32–34.5)
MCV RBC AUTO: 87.4 FL (ref 80–99.9)
MONOCYTES ABSOLUTE: 0.66 E9/L (ref 0.1–0.95)
MONOCYTES RELATIVE PERCENT: 3.5 % (ref 2–12)
NEUTROPHILS ABSOLUTE: 15.94 E9/L (ref 1.8–7.3)
NEUTROPHILS RELATIVE PERCENT: 95.7 % (ref 43–80)
OVALOCYTES: ABNORMAL
PDW BLD-RTO: 22.8 FL (ref 11.5–15)
PLATELET # BLD: 164 E9/L (ref 130–450)
PMV BLD AUTO: 12.2 FL (ref 7–12)
POIKILOCYTES: ABNORMAL
POLYCHROMASIA: ABNORMAL
POTASSIUM SERPL-SCNC: 2.5 MMOL/L (ref 3.5–5)
RBC # BLD: 3.49 E12/L (ref 3.8–5.8)
SODIUM BLD-SCNC: 136 MMOL/L (ref 132–146)
TOTAL PROTEIN: 6.5 G/DL (ref 6.4–8.3)
WBC # BLD: 16.6 E9/L (ref 4.5–11.5)

## 2021-10-18 PROCEDURE — 4500000002 HC ER NO CHARGE

## 2021-10-18 PROCEDURE — 99282 EMERGENCY DEPT VISIT SF MDM: CPT

## 2021-10-18 PROCEDURE — 83690 ASSAY OF LIPASE: CPT

## 2021-10-18 PROCEDURE — 83605 ASSAY OF LACTIC ACID: CPT

## 2021-10-18 PROCEDURE — 85025 COMPLETE CBC W/AUTO DIFF WBC: CPT

## 2021-10-18 PROCEDURE — 80053 COMPREHEN METABOLIC PANEL: CPT

## 2021-10-18 RX ORDER — POTASSIUM CHLORIDE 750 MG/1
20 CAPSULE, EXTENDED RELEASE ORAL DAILY
COMMUNITY

## 2021-10-18 RX ORDER — FUROSEMIDE 40 MG/1
40 TABLET ORAL DAILY
COMMUNITY

## 2021-10-18 ASSESSMENT — PAIN SCALES - GENERAL: PAINLEVEL_OUTOF10: 5

## 2021-10-18 ASSESSMENT — PAIN DESCRIPTION - LOCATION: LOCATION: ABDOMEN

## 2021-10-19 ENCOUNTER — APPOINTMENT (OUTPATIENT)
Dept: GENERAL RADIOLOGY | Age: 71
End: 2021-10-19
Payer: MEDICARE

## 2021-10-19 VITALS
OXYGEN SATURATION: 97 % | HEIGHT: 69 IN | DIASTOLIC BLOOD PRESSURE: 71 MMHG | TEMPERATURE: 98.2 F | HEART RATE: 109 BPM | SYSTOLIC BLOOD PRESSURE: 124 MMHG | WEIGHT: 140 LBS | BODY MASS INDEX: 20.73 KG/M2 | RESPIRATION RATE: 20 BRPM

## 2021-10-19 LAB
ALBUMIN SERPL-MCNC: 2.5 G/DL (ref 3.5–5.2)
ALP BLD-CCNC: 205 U/L (ref 40–129)
ALT SERPL-CCNC: 40 U/L (ref 0–40)
ANION GAP SERPL CALCULATED.3IONS-SCNC: 5 MMOL/L (ref 7–16)
ANISOCYTOSIS: ABNORMAL
AST SERPL-CCNC: 111 U/L (ref 0–39)
BASOPHILS ABSOLUTE: 0 E9/L (ref 0–0.2)
BASOPHILS RELATIVE PERCENT: 0.1 % (ref 0–2)
BILIRUB SERPL-MCNC: 1.6 MG/DL (ref 0–1.2)
BUN BLDV-MCNC: 9 MG/DL (ref 6–23)
CALCIUM SERPL-MCNC: 8 MG/DL (ref 8.6–10.2)
CHLORIDE BLD-SCNC: 96 MMOL/L (ref 98–107)
CO2: 34 MMOL/L (ref 22–29)
CREAT SERPL-MCNC: 1 MG/DL (ref 0.7–1.2)
EOSINOPHILS ABSOLUTE: 0.13 E9/L (ref 0.05–0.5)
EOSINOPHILS RELATIVE PERCENT: 0.9 % (ref 0–6)
GFR AFRICAN AMERICAN: >60
GFR NON-AFRICAN AMERICAN: >60 ML/MIN/1.73
GLUCOSE BLD-MCNC: 108 MG/DL (ref 74–99)
HCT VFR BLD CALC: 29.3 % (ref 37–54)
HEMOGLOBIN: 9.1 G/DL (ref 12.5–16.5)
HYPOCHROMIA: ABNORMAL
INR BLD: 1.8
LACTIC ACID: 2.2 MMOL/L (ref 0.5–2.2)
LIPASE: 68 U/L (ref 13–60)
LYMPHOCYTES ABSOLUTE: 0.43 E9/L (ref 1.5–4)
LYMPHOCYTES RELATIVE PERCENT: 2.6 % (ref 20–42)
MCH RBC QN AUTO: 26.5 PG (ref 26–35)
MCHC RBC AUTO-ENTMCNC: 31.1 % (ref 32–34.5)
MCV RBC AUTO: 85.4 FL (ref 80–99.9)
METAMYELOCYTES RELATIVE PERCENT: 0.9 % (ref 0–1)
MONOCYTES ABSOLUTE: 0.72 E9/L (ref 0.1–0.95)
MONOCYTES RELATIVE PERCENT: 5.2 % (ref 2–12)
NEUTROPHILS ABSOLUTE: 13.01 E9/L (ref 1.8–7.3)
NEUTROPHILS RELATIVE PERCENT: 90.5 % (ref 43–80)
OVALOCYTES: ABNORMAL
PDW BLD-RTO: 23.3 FL (ref 11.5–15)
PLATELET # BLD: 142 E9/L (ref 130–450)
PMV BLD AUTO: 11.8 FL (ref 7–12)
POIKILOCYTES: ABNORMAL
POLYCHROMASIA: ABNORMAL
POTASSIUM SERPL-SCNC: 2.2 MMOL/L (ref 3.5–5)
PROTHROMBIN TIME: 20 SEC (ref 9.3–12.4)
RBC # BLD: 3.43 E12/L (ref 3.8–5.8)
SODIUM BLD-SCNC: 135 MMOL/L (ref 132–146)
TARGET CELLS: ABNORMAL
TOTAL PROTEIN: 6.5 G/DL (ref 6.4–8.3)
WBC # BLD: 14.3 E9/L (ref 4.5–11.5)

## 2021-10-19 PROCEDURE — 83605 ASSAY OF LACTIC ACID: CPT

## 2021-10-19 PROCEDURE — 6370000000 HC RX 637 (ALT 250 FOR IP): Performed by: PHYSICIAN ASSISTANT

## 2021-10-19 PROCEDURE — 80053 COMPREHEN METABOLIC PANEL: CPT

## 2021-10-19 PROCEDURE — 71045 X-RAY EXAM CHEST 1 VIEW: CPT

## 2021-10-19 PROCEDURE — 83690 ASSAY OF LIPASE: CPT

## 2021-10-19 PROCEDURE — 99283 EMERGENCY DEPT VISIT LOW MDM: CPT

## 2021-10-19 PROCEDURE — 85025 COMPLETE CBC W/AUTO DIFF WBC: CPT

## 2021-10-19 PROCEDURE — 85610 PROTHROMBIN TIME: CPT

## 2021-10-19 RX ORDER — POTASSIUM CHLORIDE 7.45 MG/ML
10 INJECTION INTRAVENOUS ONCE
Status: DISCONTINUED | OUTPATIENT
Start: 2021-10-19 | End: 2021-10-20 | Stop reason: HOSPADM

## 2021-10-19 RX ORDER — POTASSIUM CHLORIDE 20 MEQ/1
40 TABLET, EXTENDED RELEASE ORAL ONCE
Status: COMPLETED | OUTPATIENT
Start: 2021-10-19 | End: 2021-10-19

## 2021-10-19 RX ADMIN — POTASSIUM CHLORIDE 40 MEQ: 1500 TABLET, EXTENDED RELEASE ORAL at 14:36

## 2021-10-19 ASSESSMENT — PAIN DESCRIPTION - LOCATION: LOCATION: ABDOMEN

## 2021-10-19 ASSESSMENT — PAIN SCALES - GENERAL: PAINLEVEL_OUTOF10: 10

## 2021-10-19 ASSESSMENT — PAIN DESCRIPTION - PAIN TYPE: TYPE: ACUTE PAIN

## 2021-10-19 ASSESSMENT — PAIN DESCRIPTION - DESCRIPTORS: DESCRIPTORS: ACHING

## 2021-10-19 NOTE — ED NOTES
FIRST PROVIDER CONTACT ASSESSMENT NOTE                                                                                                Department of Emergency Medicine                                                      First Provider Note  10/19/21  12:45 PM EDT  NAME: iRa Martinez  : 1950  MRN: [id number]    Chief Complaint: No chief complaint on file. History of Present Illness:   Ria Martinez is a 69 y.o. male who presents to the ED for abdominal pain. Patient was seen by Dr. Alejandro Perales today and is here to have his ascites drained. Patient states he was here yesterday all day and left due to the weight and called his doctor and he sent him back in    Focused Physical Exam:  VS:    ED Triage Vitals [10/19/21 1139]   BP Temp Temp src Pulse Resp SpO2 Height Weight   -- -- -- 94 -- 96 % -- --        General: Alert and in no apparent distress. Medical History:  has a past medical history of Anemia, Ascites, and Encounter for blood transfusion. Surgical History:  has a past surgical history that includes Upper gastrointestinal endoscopy (N/A, 2021) and Upper gastrointestinal endoscopy (N/A, 2021). Social History:  reports that he has quit smoking. He has never used smokeless tobacco.    Family History: family history is not on file.     Allergies: Penicillins     Initial Plan of Care:  Initiate Treatment-Testing, Proceed toTreatment Area When Bed Available for ED Attending/MLP to Continue Care    -------------------------------------------------END OF FIRST PROVIDER CONTACT ASSESSMENT NOTE--------------------------------------------------------  Electronically signed by Kiah Gerardo PA-C   DD: 10/19/21       Kiah Gerardo PA-C  10/19/21 2779

## 2021-10-19 NOTE — ED PROVIDER NOTES
[facility]  Department of Emergency Medicine     Written by: Payal Ford DO  Patient Name: Magali Dowling  Attending Provider: No att. providers found  Admit Date: 10/20/2021 12:28 AM  MRN: [id number]                   : 1950        Chief Complaint   Patient presents with   Rose Maryfrancis Bhardwaj     Patient sent in by dr Pamela Piper to have his belly drained he has a history of ascites states he was here yesterday and waited for hours and left     - Chief complaint    HPI     Patient is a 25-year-old male with history of liver failure, ICU admission on  for bleeding esophageal varices and received x3 units PRBCs and underwent EGD with variceal banding at that time, who presents the ED for evaluation of abdominal swelling. He was sent here by his gastroenterologist yesterday for paracentesis but left because of the long wait. Complaint is severe in severity, associated with abdominal pain that is worse with palpation, no specific alleviating factors, and has been worsening over the past 2 weeks. Patient also has lower extremity edema that is worsening over the past week. Denies any known history of heart failure; states that he was prescribed Lasix and potassium in 1 week ago and was told that he is going to need to see a cardiologist.  He denies any recent vomiting but does endorse nausea with dry heaving; denies any hematemesis, diarrhea, black or bloody stools. His last paracentesis was 1 year ago. He sees Dr. Ori Novak for GI. Review of Systems   Constitutional: Positive for fatigue. Negative for chills and fever. HENT: Negative for rhinorrhea and sore throat. Eyes: Negative for visual disturbance. Respiratory: Negative for cough and shortness of breath. Cardiovascular: Positive for leg swelling. Negative for chest pain and palpitations. Gastrointestinal: Positive for abdominal distention, abdominal pain and nausea. Negative for diarrhea and vomiting.    Endocrine: Negative for polydipsia and polyuria. Genitourinary: Negative for dysuria and frequency. Musculoskeletal: Negative for back pain and myalgias. Skin: Negative for rash and wound. Neurological: Negative for weakness and headaches. Psychiatric/Behavioral: Negative for confusion. All other systems reviewed and are negative. Physical Exam  Vitals and nursing note reviewed. Constitutional:       Appearance: He is ill-appearing. Comments: Patient is alert, oriented, responds to questions and examination appropriately. No acute distress but does appear uncomfortable due to symptoms. HENT:      Head: Normocephalic and atraumatic. Right Ear: External ear normal.      Left Ear: External ear normal.      Nose: Nose normal. No rhinorrhea. Mouth/Throat:      Mouth: Mucous membranes are moist.      Pharynx: Oropharynx is clear. Eyes:      Extraocular Movements: Extraocular movements intact. Conjunctiva/sclera: Conjunctivae normal.      Pupils: Pupils are equal, round, and reactive to light. Cardiovascular:      Rate and Rhythm: Normal rate and regular rhythm. Pulses: Normal pulses. Heart sounds: Normal heart sounds. Pulmonary:      Effort: Pulmonary effort is normal. No respiratory distress. Breath sounds: Normal breath sounds. No wheezing or rales. Abdominal:      General: Bowel sounds are normal. There is distension. Palpations: Abdomen is soft. Tenderness: There is abdominal tenderness (moderate, generalize). There is no right CVA tenderness, left CVA tenderness, guarding or rebound. Musculoskeletal:         General: No tenderness. Normal range of motion. Cervical back: Normal range of motion and neck supple. Right lower leg: Edema (2+ pitting) present. Left lower leg: Edema (2+ pitting) present. Skin:     General: Skin is warm and dry. Capillary Refill: Capillary refill takes less than 2 seconds.       Coloration: Skin is not jaundiced or pale. Findings: No rash. Neurological:      General: No focal deficit present. Mental Status: He is alert and oriented to person, place, and time. Sensory: No sensory deficit. Psychiatric:         Mood and Affect: Mood normal.         Behavior: Behavior normal.          Procedures       MDM     Presents for evaluation of abdominal ascites; sent in by his GI doctor. Vitals show tachycardia in low 100s on arrival, otherwise stable. Initial labs notable for hypokalemia 2.2, worsened when compared to yesterday at 2.5; initial oral repletion ordered, additional ordered via IV; repeat labs ordered as well. Patient abdomen is soft, distended, tympanic; moderate generalized abdominal TTP. No jaundice noted. Lungs CTA bilaterally. Given severity of labs, patient was to be seen and provided IV medications and was going to be admitted for further evaluation of his abdominal pain, ascites, hypokalemia; before this occurred patient eloped from the department and was nowhere to be found. Attempts were made to contact his phone number and his listed emergency contact; one voicemail was left instructing them to call back to the ER. Patient eloped from the department. I have discussed this patient with my attending, who has seen the patient and agrees with this disposition. Patient was seen and evaluated by myself and my attending Fay Cogan, DO. Assessment and Plan discussed with attending provider, please see attestation for final plan of care.      Results for orders placed or performed during the hospital encounter of 10/20/21   CBC auto differential   Result Value Ref Range    WBC 14.3 (H) 4.5 - 11.5 E9/L    RBC 3.43 (L) 3.80 - 5.80 E12/L    Hemoglobin 9.1 (L) 12.5 - 16.5 g/dL    Hematocrit 29.3 (L) 37.0 - 54.0 %    MCV 85.4 80.0 - 99.9 fL    MCH 26.5 26.0 - 35.0 pg    MCHC 31.1 (L) 32.0 - 34.5 %    RDW 23.3 (H) 11.5 - 15.0 fL    Platelets 999 300 - 236 E9/L    MPV 11.8 7.0 - 12.0 fL    Neutrophils % 90.5 (H) 43.0 - 80.0 %    Lymphocytes % 2.6 (L) 20.0 - 42.0 %    Monocytes % 5.2 2.0 - 12.0 %    Eosinophils % 0.9 0.0 - 6.0 %    Basophils % 0.1 0.0 - 2.0 %    Neutrophils Absolute 13.01 (H) 1.80 - 7.30 E9/L    Lymphocytes Absolute 0.43 (L) 1.50 - 4.00 E9/L    Monocytes Absolute 0.72 0.10 - 0.95 E9/L    Eosinophils Absolute 0.13 0.05 - 0.50 E9/L    Basophils Absolute 0.00 0.00 - 0.20 E9/L    Metamyelocytes Relative 0.9 0.0 - 1.0 %    Anisocytosis 2+     Polychromasia 1+     Hypochromia 1+     Poikilocytes 1+     Ovalocytes 1+     Target Cells 1+    Comprehensive Metabolic Panel   Result Value Ref Range    Sodium 135 132 - 146 mmol/L    Potassium 2.2 (LL) 3.5 - 5.0 mmol/L    Chloride 96 (L) 98 - 107 mmol/L    CO2 34 (H) 22 - 29 mmol/L    Anion Gap 5 (L) 7 - 16 mmol/L    Glucose 108 (H) 74 - 99 mg/dL    BUN 9 6 - 23 mg/dL    CREATININE 1.0 0.7 - 1.2 mg/dL    GFR Non-African American >60 >=60 mL/min/1.73    GFR African American >60     Calcium 8.0 (L) 8.6 - 10.2 mg/dL    Total Protein 6.5 6.4 - 8.3 g/dL    Albumin 2.5 (L) 3.5 - 5.2 g/dL    Total Bilirubin 1.6 (H) 0.0 - 1.2 mg/dL    Alkaline Phosphatase 205 (H) 40 - 129 U/L    ALT 40 0 - 40 U/L     (H) 0 - 39 U/L   Protime-INR   Result Value Ref Range    Protime 20.0 (H) 9.3 - 12.4 sec    INR 1.8    Lipase   Result Value Ref Range    Lipase 68 (H) 13 - 60 U/L   Lactic Acid, Plasma   Result Value Ref Range    Lactic Acid 2.2 0.5 - 2.2 mmol/L       XR CHEST PORTABLE   Final Result   No acute process. CT HEAD WO CONTRAST    (Results Pending)   CT ABDOMEN PELVIS W IV CONTRAST Additional Contrast? None    (Results Pending)   CT CERVICAL SPINE WO CONTRAST    (Results Pending)         --------------------------------- ADDITIONAL PROVIDER NOTES ---------------------------------  3:30 AM EDT  Patient appears to have eloped from the emergency department. Multiple attempts were made to locate the patient but were unsuccessful.     Patient was

## 2021-10-20 ENCOUNTER — HOSPITAL ENCOUNTER (EMERGENCY)
Age: 71
Discharge: LEFT BEFORE DISCHARGE | End: 2021-10-20
Attending: STUDENT IN AN ORGANIZED HEALTH CARE EDUCATION/TRAINING PROGRAM
Payer: MEDICARE

## 2021-10-20 DIAGNOSIS — R18.8 OTHER ASCITES: ICD-10-CM

## 2021-10-20 DIAGNOSIS — E87.6 HYPOKALEMIA: Primary | ICD-10-CM

## 2021-10-20 DIAGNOSIS — R74.01 TRANSAMINITIS: ICD-10-CM

## 2021-10-20 DIAGNOSIS — M79.89 LEG SWELLING: ICD-10-CM

## 2021-10-20 DIAGNOSIS — R10.84 GENERALIZED ABDOMINAL PAIN: ICD-10-CM

## 2021-10-20 ASSESSMENT — ENCOUNTER SYMPTOMS
ABDOMINAL PAIN: 1
SORE THROAT: 0
BACK PAIN: 0
VOMITING: 0
SHORTNESS OF BREATH: 0
DIARRHEA: 0
COUGH: 0
RHINORRHEA: 0
ABDOMINAL DISTENTION: 1
NAUSEA: 1

## 2024-01-24 NOTE — PROGRESS NOTES
[facility], [place], operated by [facility]   Consult    Chief Complaint  Prostate Cancer      Diagnosis: Favorable Intermediate Risk Prostate Cancer    Overall Stage IIB    [id number]        Pacemaker: no  Prior History of Radiation: no  Contraindications to Radiation: no  Patient Requires Pregnancy Test: No, patient is male      HPI:    Duc Hernandez is a 73 y.o. male with a history of smoking, CVA, HTN, carotid stenosis, PVD.  The patient has had rising PSA over the past several years.  In 2023 he underwent biopsy which showed 1 core of grade group 1 and 1 core of grade group 2 prostate cancer.  He has met with urology who discussed potential resection with him.  Overall, they recommended radiation as definitive management of his prostate cancer.  He has not undergone any imaging evaluations.      Imaging:      No relevant imaging      Pathology:      Prostate Biopsy Pathology 3/29/2023            Labs:    Pre-Biopsy PSA 7.423    PSA History:              Problem List:  There is no problem list on file for this patient.         Medications:  No current outpatient medications on file prior to visit.     No current facility-administered medications on file prior to visit.          Allergies:  No Known Allergies      Family History:  Brother and Brother's son with prostate cancer  Father  of cancer at young age, unknown type      Social History:  Former Smoker    Distance From Clinic: <30 minutes    Patient has someone who can assist with transportation: no      Review of Systems:    Review of Systems   Constitutional:  Negative for chills, fever and unexpected weight change.   HENT:  Negative for sore throat and trouble swallowing.    Eyes:  Negative for photophobia and visual disturbance.   Respiratory:  Negative for cough, chest tightness and shortness of breath.    Cardiovascular:  Negative for chest pain and leg swelling.   Gastrointestinal:  Negative for abdominal pain, constipation, diarrhea, nausea and vomiting.    Endocrine: Negative for cold intolerance and heat intolerance.   Genitourinary:  Positive for difficulty urinating and frequency. Negative for dysuria.   Musculoskeletal:  Negative for back pain, joint swelling and myalgias.   Skin:  Negative for rash.   Neurological:  Negative for dizziness, seizures, syncope, facial asymmetry, speech difficulty, weakness, light-headedness, numbness and headaches.   Hematological:  Negative for adenopathy.   Psychiatric/Behavioral:  Negative for confusion and suicidal ideas.    All other systems reviewed and are negative.        Vital Signs:  /69   Pulse 56   Wt 68.7 kg (151 lb 6.4 oz)   SpO2 95%   There is no height or weight on file to calculate BMI.  Pain Score    01/26/24 1309   PainSc: 0-No pain         ECOG: Fully active, able to carry on all pre-disease performance without restriction = 0      Physical Exam  Vitals reviewed.   Constitutional:       General: He is not in acute distress.     Appearance: Normal appearance.   HENT:      Head: Normocephalic and atraumatic.   Eyes:      Extraocular Movements: Extraocular movements intact.      Pupils: Pupils are equal, round, and reactive to light.   Pulmonary:      Effort: Pulmonary effort is normal.   Abdominal:      General: Abdomen is flat.      Palpations: Abdomen is soft.   Musculoskeletal:      Cervical back: Normal range of motion.   Skin:     General: Skin is warm and dry.   Neurological:      General: No focal deficit present.      Mental Status: He is alert and oriented to person, place, and time.   Psychiatric:         Mood and Affect: Mood normal.         Behavior: Behavior normal.     RAKESH - No palpable lesions on prostate    Result Review :  The following data was reviewed by: Rodri Wallace MD on 01/26/2024:  Labs: Last Creatinine            Diagnoses and all orders for this visit:    1. Prostate cancer (Primary)  -     MRI Prostate With & Without Contrast; Future      Assessment:    Duc Hernandez is a 73  y.o. male with a history of smoking, CVA, HTN, carotid stenosis, PVD.  The patient has had rising PSA over the past several years.  In March 2023 he underwent biopsy which showed 1 core of grade group 1 and 1 core of grade group 2 prostate cancer.  He has met with urology who discussed potential resection with him.  Overall, they recommended radiation as definitive management of his prostate cancer.  He has not undergone any imaging evaluations.    I met with the patient and discussed his workup to date.  I discussed the risk, benefits, side effects, logistics of radiation treatment planning delivery for favorable intermediate risk prostate cancer.  I discussed alternative treatments including surgical resection, active surveillance, or referral to brachytherapy.  I answered all the patient's questions to his satisfaction.  The end of our conversation the patient wished to proceed with definitive external beam radiation therapy for his prostate cancer.  Consents were signed.  Plan for CT simulation on 2/6/2024.  The meantime I will order MRI prostate for treatment planning.    Plan:    -Plan for definitive external beam radiation therapy for intermediate risk prostate cancer.  MRI ordered.  Plan for CT simulation 2/6/2024.       I spent 60 minutes caring for Duc on this date of service. This time includes time spent by me in the following activities:preparing for the visit, reviewing tests, obtaining and/or reviewing a separately obtained history, documenting information in the medical record, independently interpreting results and communicating that information with the patient/family/caregiver, and care coordination  Follow Up   No follow-ups on file.  Patient was given instructions and counseling regarding his condition or for health maintenance advice. Please see specific information pulled into the AVS if appropriate.     Rodri Wallace MD

## 2024-01-25 ENCOUNTER — TELEPHONE (OUTPATIENT)
Dept: RADIATION ONCOLOGY | Facility: HOSPITAL | Age: 74
End: 2024-01-25

## 2024-01-26 ENCOUNTER — CONSULT (OUTPATIENT)
Dept: RADIATION ONCOLOGY | Facility: HOSPITAL | Age: 74
End: 2024-01-26
Payer: OTHER GOVERNMENT

## 2024-01-26 VITALS
DIASTOLIC BLOOD PRESSURE: 69 MMHG | HEART RATE: 56 BPM | SYSTOLIC BLOOD PRESSURE: 146 MMHG | OXYGEN SATURATION: 95 % | WEIGHT: 151.4 LBS

## 2024-01-26 DIAGNOSIS — C61 PROSTATE CANCER: Primary | ICD-10-CM

## 2024-01-26 PROCEDURE — G0463 HOSPITAL OUTPT CLINIC VISIT: HCPCS

## 2024-01-31 ENCOUNTER — HOSPITAL ENCOUNTER (OUTPATIENT)
Dept: MRI IMAGING | Facility: HOSPITAL | Age: 74
Discharge: HOME | End: 2024-01-31
Admitting: STUDENT IN AN ORGANIZED HEALTH CARE EDUCATION/TRAINING PROGRAM
Payer: OTHER GOVERNMENT

## 2024-01-31 DIAGNOSIS — C61 PROSTATE CANCER: ICD-10-CM

## 2024-01-31 LAB — CREAT BLDA-MCNC: 1.8 MG/DL (ref 0.6–1.3)

## 2024-01-31 PROCEDURE — 0 GADOBENATE DIMEGLUMINE 529 MG/ML SOLUTION: Performed by: STUDENT IN AN ORGANIZED HEALTH CARE EDUCATION/TRAINING PROGRAM

## 2024-01-31 PROCEDURE — A9577 INJ MULTIHANCE: HCPCS | Performed by: STUDENT IN AN ORGANIZED HEALTH CARE EDUCATION/TRAINING PROGRAM

## 2024-01-31 PROCEDURE — 72197 MRI PELVIS W/O & W/DYE: CPT

## 2024-01-31 PROCEDURE — 82565 ASSAY OF CREATININE: CPT

## 2024-01-31 RX ADMIN — GADOBENATE DIMEGLUMINE 14 ML: 529 INJECTION, SOLUTION INTRAVENOUS at 10:06

## 2024-02-06 RX ORDER — FERROUS SULFATE 325(65) MG
325 TABLET ORAL
COMMUNITY

## 2024-02-06 RX ORDER — ATORVASTATIN CALCIUM 10 MG/1
10 TABLET, FILM COATED ORAL DAILY
COMMUNITY

## 2024-02-06 RX ORDER — CETIRIZINE HYDROCHLORIDE 10 MG/1
10 TABLET ORAL DAILY
COMMUNITY

## 2024-02-06 RX ORDER — ASPIRIN 325 MG
325 TABLET ORAL DAILY
COMMUNITY

## 2024-02-06 RX ORDER — AMLODIPINE BESYLATE 10 MG/1
10 TABLET ORAL DAILY
COMMUNITY

## 2024-02-06 RX ORDER — CHLORTHALIDONE 25 MG/1
25 TABLET ORAL DAILY
COMMUNITY

## 2024-02-06 RX ORDER — LOSARTAN POTASSIUM AND HYDROCHLOROTHIAZIDE 25; 100 MG/1; MG/1
1 TABLET ORAL DAILY
COMMUNITY

## 2024-02-06 RX ORDER — METOPROLOL TARTRATE 50 MG/1
50 TABLET, FILM COATED ORAL DAILY
COMMUNITY

## 2024-02-06 RX ORDER — DIPHENOXYLATE HYDROCHLORIDE AND ATROPINE SULFATE 2.5; .025 MG/1; MG/1
1 TABLET ORAL DAILY
COMMUNITY

## 2024-02-08 ENCOUNTER — TELEPHONE (OUTPATIENT)
Dept: RADIATION ONCOLOGY | Facility: HOSPITAL | Age: 74
End: 2024-02-08
Payer: OTHER GOVERNMENT

## 2024-02-08 ENCOUNTER — HOSPITAL ENCOUNTER (OUTPATIENT)
Dept: RADIATION ONCOLOGY | Facility: HOSPITAL | Age: 74
Setting detail: RADIATION/ONCOLOGY SERIES
End: 2024-02-08
Payer: OTHER GOVERNMENT

## 2024-02-08 PROCEDURE — 77334 RADIATION TREATMENT AID(S): CPT | Performed by: STUDENT IN AN ORGANIZED HEALTH CARE EDUCATION/TRAINING PROGRAM

## 2024-02-08 PROCEDURE — 77263 THER RADIOLOGY TX PLNG CPLX: CPT | Performed by: STUDENT IN AN ORGANIZED HEALTH CARE EDUCATION/TRAINING PROGRAM

## 2024-02-08 NOTE — TELEPHONE ENCOUNTER
Call placed to pt. About MRI scan. Notified scan looks as to be expected and will not change treatment plan. Pt. Verbalized understanding.

## 2024-02-19 PROCEDURE — 77338 DESIGN MLC DEVICE FOR IMRT: CPT | Performed by: STUDENT IN AN ORGANIZED HEALTH CARE EDUCATION/TRAINING PROGRAM

## 2024-02-19 PROCEDURE — 77300 RADIATION THERAPY DOSE PLAN: CPT | Performed by: STUDENT IN AN ORGANIZED HEALTH CARE EDUCATION/TRAINING PROGRAM

## 2024-02-19 PROCEDURE — 77301 RADIOTHERAPY DOSE PLAN IMRT: CPT | Performed by: STUDENT IN AN ORGANIZED HEALTH CARE EDUCATION/TRAINING PROGRAM

## 2024-02-21 ENCOUNTER — HOSPITAL ENCOUNTER (OUTPATIENT)
Dept: RADIATION ONCOLOGY | Facility: HOSPITAL | Age: 74
Discharge: HOME | End: 2024-02-21

## 2024-02-21 LAB
RAD ONC ARIA COURSE ID: NORMAL
RAD ONC ARIA COURSE INTENT: NORMAL
RAD ONC ARIA COURSE LAST TREATMENT DATE: NORMAL
RAD ONC ARIA COURSE START DATE: NORMAL
RAD ONC ARIA COURSE TREATMENT ELAPSED DAYS: 0
RAD ONC ARIA FIRST TREATMENT DATE: NORMAL
RAD ONC ARIA PLAN FRACTIONS TREATED TO DATE: 1
RAD ONC ARIA PLAN ID: NORMAL
RAD ONC ARIA PLAN PRESCRIBED DOSE PER FRACTION: 2 GY
RAD ONC ARIA PLAN PRIMARY REFERENCE POINT: NORMAL
RAD ONC ARIA PLAN TOTAL FRACTIONS PRESCRIBED: 27
RAD ONC ARIA PLAN TOTAL PRESCRIBED DOSE: 5400 CGY
RAD ONC ARIA REFERENCE POINT DOSAGE GIVEN TO DATE: 2 GY
RAD ONC ARIA REFERENCE POINT ID: NORMAL
RAD ONC ARIA REFERENCE POINT SESSION DOSAGE GIVEN: 2 GY

## 2024-02-21 PROCEDURE — 77385: CPT | Performed by: STUDENT IN AN ORGANIZED HEALTH CARE EDUCATION/TRAINING PROGRAM

## 2024-02-21 PROCEDURE — 77014 CHG CT GUIDANCE RADIATION THERAPY FLDS PLACEMENT: CPT | Performed by: STUDENT IN AN ORGANIZED HEALTH CARE EDUCATION/TRAINING PROGRAM

## 2024-02-21 PROCEDURE — 77427 RADIATION TX MANAGEMENT X5: CPT | Performed by: STUDENT IN AN ORGANIZED HEALTH CARE EDUCATION/TRAINING PROGRAM

## 2024-02-22 ENCOUNTER — HOSPITAL ENCOUNTER (OUTPATIENT)
Dept: RADIATION ONCOLOGY | Facility: HOSPITAL | Age: 74
Discharge: HOME | End: 2024-02-22

## 2024-02-22 LAB
RAD ONC ARIA COURSE ID: NORMAL
RAD ONC ARIA COURSE INTENT: NORMAL
RAD ONC ARIA COURSE LAST TREATMENT DATE: NORMAL
RAD ONC ARIA COURSE START DATE: NORMAL
RAD ONC ARIA COURSE TREATMENT ELAPSED DAYS: 1
RAD ONC ARIA FIRST TREATMENT DATE: NORMAL
RAD ONC ARIA PLAN FRACTIONS TREATED TO DATE: 2
RAD ONC ARIA PLAN ID: NORMAL
RAD ONC ARIA PLAN PRESCRIBED DOSE PER FRACTION: 2 GY
RAD ONC ARIA PLAN PRIMARY REFERENCE POINT: NORMAL
RAD ONC ARIA PLAN TOTAL FRACTIONS PRESCRIBED: 27
RAD ONC ARIA PLAN TOTAL PRESCRIBED DOSE: 5400 CGY
RAD ONC ARIA REFERENCE POINT DOSAGE GIVEN TO DATE: 4 GY
RAD ONC ARIA REFERENCE POINT ID: NORMAL
RAD ONC ARIA REFERENCE POINT SESSION DOSAGE GIVEN: 2 GY

## 2024-02-22 PROCEDURE — 77014 CHG CT GUIDANCE RADIATION THERAPY FLDS PLACEMENT: CPT | Performed by: STUDENT IN AN ORGANIZED HEALTH CARE EDUCATION/TRAINING PROGRAM

## 2024-02-22 PROCEDURE — 77385: CPT | Performed by: STUDENT IN AN ORGANIZED HEALTH CARE EDUCATION/TRAINING PROGRAM

## 2024-02-23 ENCOUNTER — HOSPITAL ENCOUNTER (OUTPATIENT)
Dept: RADIATION ONCOLOGY | Facility: HOSPITAL | Age: 74
Discharge: HOME | End: 2024-02-23

## 2024-02-23 LAB
RAD ONC ARIA COURSE ID: NORMAL
RAD ONC ARIA COURSE INTENT: NORMAL
RAD ONC ARIA COURSE LAST TREATMENT DATE: NORMAL
RAD ONC ARIA COURSE START DATE: NORMAL
RAD ONC ARIA COURSE TREATMENT ELAPSED DAYS: 2
RAD ONC ARIA FIRST TREATMENT DATE: NORMAL
RAD ONC ARIA PLAN FRACTIONS TREATED TO DATE: 3
RAD ONC ARIA PLAN ID: NORMAL
RAD ONC ARIA PLAN PRESCRIBED DOSE PER FRACTION: 2 GY
RAD ONC ARIA PLAN PRIMARY REFERENCE POINT: NORMAL
RAD ONC ARIA PLAN TOTAL FRACTIONS PRESCRIBED: 27
RAD ONC ARIA PLAN TOTAL PRESCRIBED DOSE: 5400 CGY
RAD ONC ARIA REFERENCE POINT DOSAGE GIVEN TO DATE: 6 GY
RAD ONC ARIA REFERENCE POINT ID: NORMAL
RAD ONC ARIA REFERENCE POINT SESSION DOSAGE GIVEN: 2 GY

## 2024-02-23 PROCEDURE — 77336 RADIATION PHYSICS CONSULT: CPT | Performed by: STUDENT IN AN ORGANIZED HEALTH CARE EDUCATION/TRAINING PROGRAM

## 2024-02-23 PROCEDURE — 77385: CPT | Performed by: STUDENT IN AN ORGANIZED HEALTH CARE EDUCATION/TRAINING PROGRAM

## 2024-02-23 PROCEDURE — 77014 CHG CT GUIDANCE RADIATION THERAPY FLDS PLACEMENT: CPT | Performed by: STUDENT IN AN ORGANIZED HEALTH CARE EDUCATION/TRAINING PROGRAM

## 2024-02-26 ENCOUNTER — HOSPITAL ENCOUNTER (OUTPATIENT)
Dept: RADIATION ONCOLOGY | Facility: HOSPITAL | Age: 74
Discharge: HOME | End: 2024-02-26
Payer: OTHER GOVERNMENT

## 2024-02-26 LAB
RAD ONC ARIA COURSE ID: NORMAL
RAD ONC ARIA COURSE INTENT: NORMAL
RAD ONC ARIA COURSE LAST TREATMENT DATE: NORMAL
RAD ONC ARIA COURSE START DATE: NORMAL
RAD ONC ARIA COURSE TREATMENT ELAPSED DAYS: 5
RAD ONC ARIA FIRST TREATMENT DATE: NORMAL
RAD ONC ARIA PLAN FRACTIONS TREATED TO DATE: 4
RAD ONC ARIA PLAN ID: NORMAL
RAD ONC ARIA PLAN PRESCRIBED DOSE PER FRACTION: 2 GY
RAD ONC ARIA PLAN PRIMARY REFERENCE POINT: NORMAL
RAD ONC ARIA PLAN TOTAL FRACTIONS PRESCRIBED: 27
RAD ONC ARIA PLAN TOTAL PRESCRIBED DOSE: 5400 CGY
RAD ONC ARIA REFERENCE POINT DOSAGE GIVEN TO DATE: 8 GY
RAD ONC ARIA REFERENCE POINT ID: NORMAL
RAD ONC ARIA REFERENCE POINT SESSION DOSAGE GIVEN: 2 GY

## 2024-02-26 PROCEDURE — 77014 CHG CT GUIDANCE RADIATION THERAPY FLDS PLACEMENT: CPT | Performed by: STUDENT IN AN ORGANIZED HEALTH CARE EDUCATION/TRAINING PROGRAM

## 2024-02-26 PROCEDURE — 77385: CPT | Performed by: STUDENT IN AN ORGANIZED HEALTH CARE EDUCATION/TRAINING PROGRAM

## 2024-02-27 ENCOUNTER — HOSPITAL ENCOUNTER (OUTPATIENT)
Dept: RADIATION ONCOLOGY | Facility: HOSPITAL | Age: 74
Discharge: HOME | End: 2024-02-27

## 2024-02-27 LAB
RAD ONC ARIA COURSE ID: NORMAL
RAD ONC ARIA COURSE INTENT: NORMAL
RAD ONC ARIA COURSE LAST TREATMENT DATE: NORMAL
RAD ONC ARIA COURSE START DATE: NORMAL
RAD ONC ARIA COURSE TREATMENT ELAPSED DAYS: 6
RAD ONC ARIA FIRST TREATMENT DATE: NORMAL
RAD ONC ARIA PLAN FRACTIONS TREATED TO DATE: 5
RAD ONC ARIA PLAN ID: NORMAL
RAD ONC ARIA PLAN PRESCRIBED DOSE PER FRACTION: 2 GY
RAD ONC ARIA PLAN PRIMARY REFERENCE POINT: NORMAL
RAD ONC ARIA PLAN TOTAL FRACTIONS PRESCRIBED: 27
RAD ONC ARIA PLAN TOTAL PRESCRIBED DOSE: 5400 CGY
RAD ONC ARIA REFERENCE POINT DOSAGE GIVEN TO DATE: 10 GY
RAD ONC ARIA REFERENCE POINT ID: NORMAL
RAD ONC ARIA REFERENCE POINT SESSION DOSAGE GIVEN: 2 GY

## 2024-02-27 PROCEDURE — 77385: CPT | Performed by: STUDENT IN AN ORGANIZED HEALTH CARE EDUCATION/TRAINING PROGRAM

## 2024-02-27 PROCEDURE — 77014 CHG CT GUIDANCE RADIATION THERAPY FLDS PLACEMENT: CPT | Performed by: STUDENT IN AN ORGANIZED HEALTH CARE EDUCATION/TRAINING PROGRAM

## 2024-02-28 ENCOUNTER — RADIATION ONCOLOGY WEEKLY ASSESSMENT (OUTPATIENT)
Dept: RADIATION ONCOLOGY | Facility: HOSPITAL | Age: 74
End: 2024-02-28
Payer: OTHER GOVERNMENT

## 2024-02-28 ENCOUNTER — HOSPITAL ENCOUNTER (OUTPATIENT)
Dept: RADIATION ONCOLOGY | Facility: HOSPITAL | Age: 74
Discharge: HOME | End: 2024-02-28

## 2024-02-28 DIAGNOSIS — C61 PROSTATE CANCER: Primary | ICD-10-CM

## 2024-02-28 LAB
RAD ONC ARIA COURSE ID: NORMAL
RAD ONC ARIA COURSE INTENT: NORMAL
RAD ONC ARIA COURSE LAST TREATMENT DATE: NORMAL
RAD ONC ARIA COURSE START DATE: NORMAL
RAD ONC ARIA COURSE TREATMENT ELAPSED DAYS: 7
RAD ONC ARIA FIRST TREATMENT DATE: NORMAL
RAD ONC ARIA PLAN FRACTIONS TREATED TO DATE: 6
RAD ONC ARIA PLAN ID: NORMAL
RAD ONC ARIA PLAN PRESCRIBED DOSE PER FRACTION: 2 GY
RAD ONC ARIA PLAN PRIMARY REFERENCE POINT: NORMAL
RAD ONC ARIA PLAN TOTAL FRACTIONS PRESCRIBED: 27
RAD ONC ARIA PLAN TOTAL PRESCRIBED DOSE: 5400 CGY
RAD ONC ARIA REFERENCE POINT DOSAGE GIVEN TO DATE: 12 GY
RAD ONC ARIA REFERENCE POINT ID: NORMAL
RAD ONC ARIA REFERENCE POINT SESSION DOSAGE GIVEN: 2 GY

## 2024-02-28 PROCEDURE — 77385: CPT | Performed by: STUDENT IN AN ORGANIZED HEALTH CARE EDUCATION/TRAINING PROGRAM

## 2024-02-28 PROCEDURE — 77014 CHG CT GUIDANCE RADIATION THERAPY FLDS PLACEMENT: CPT | Performed by: STUDENT IN AN ORGANIZED HEALTH CARE EDUCATION/TRAINING PROGRAM

## 2024-02-28 NOTE — PROGRESS NOTES
Radiation Oncology  On-Treatment Note      Patient: Duc Hernandez    MRN: [id number]    Attending Physician: Rodri Wallace MD     Diagnosis:     ICD-10-CM ICD-9-CM   1. Prostate cancer  C61 185       Radiation Therapy Visit:  Continue radiation therapy, Dosimetry plan remains acceptable, Films reviewed and remains acceptable, Pain assessed, Pain management planned, Radiation dose schedule reviewed and remains acceptable, Radiation technique remains acceptable, and Symptoms within expected range    Radiation Treatments       Active   Plans   Prostate+SV   Most recent treatment: Dose planned: 200 cGy (fraction 6 on 2/28/2024)   Total: Dose planned: 5,400 cGy (27 fractions)   Elapsed Days: 7      Reference Points   54 sequential   Most recent treatment: Dose given: 200 cGy (on 2/28/2024)   Total: Dose given: 1,200 cGy   Elapsed Days: 7                      Physical Examination:  Vitals: There were no vitals taken for this visit.  Pain Score    02/28/24 1402   PainSc: 0-No pain       Fully active, able to carry on all pre-disease performance without restriction = 0    We examined the relevant areas: yes  Findings are within the expected range for this stage of treatment: yes  -------------------------------------------------------------------------------------------------------------------    ACTION ITEMS:  Patient tolerating treatment well and as expected for this stage in their treatment and Continue radiation therapy as planned    Estimated Completion Date: 4/11/2024      Rodri Wallace MD  Radiation Oncology

## 2024-02-29 ENCOUNTER — HOSPITAL ENCOUNTER (OUTPATIENT)
Dept: RADIATION ONCOLOGY | Facility: HOSPITAL | Age: 74
Discharge: HOME | End: 2024-02-29

## 2024-02-29 LAB
RAD ONC ARIA COURSE ID: NORMAL
RAD ONC ARIA COURSE INTENT: NORMAL
RAD ONC ARIA COURSE LAST TREATMENT DATE: NORMAL
RAD ONC ARIA COURSE START DATE: NORMAL
RAD ONC ARIA COURSE TREATMENT ELAPSED DAYS: 8
RAD ONC ARIA FIRST TREATMENT DATE: NORMAL
RAD ONC ARIA PLAN FRACTIONS TREATED TO DATE: 7
RAD ONC ARIA PLAN ID: NORMAL
RAD ONC ARIA PLAN PRESCRIBED DOSE PER FRACTION: 2 GY
RAD ONC ARIA PLAN PRIMARY REFERENCE POINT: NORMAL
RAD ONC ARIA PLAN TOTAL FRACTIONS PRESCRIBED: 27
RAD ONC ARIA PLAN TOTAL PRESCRIBED DOSE: 5400 CGY
RAD ONC ARIA REFERENCE POINT DOSAGE GIVEN TO DATE: 14 GY
RAD ONC ARIA REFERENCE POINT ID: NORMAL
RAD ONC ARIA REFERENCE POINT SESSION DOSAGE GIVEN: 2 GY

## 2024-02-29 PROCEDURE — 77014 CHG CT GUIDANCE RADIATION THERAPY FLDS PLACEMENT: CPT | Performed by: STUDENT IN AN ORGANIZED HEALTH CARE EDUCATION/TRAINING PROGRAM

## 2024-02-29 PROCEDURE — 77385: CPT | Performed by: STUDENT IN AN ORGANIZED HEALTH CARE EDUCATION/TRAINING PROGRAM

## 2024-03-01 ENCOUNTER — HOSPITAL ENCOUNTER (OUTPATIENT)
Dept: RADIATION ONCOLOGY | Facility: HOSPITAL | Age: 74
Discharge: HOME | End: 2024-03-01

## 2024-03-01 ENCOUNTER — HOSPITAL ENCOUNTER (OUTPATIENT)
Dept: RADIATION ONCOLOGY | Facility: HOSPITAL | Age: 74
Setting detail: RADIATION/ONCOLOGY SERIES
End: 2024-03-01
Payer: OTHER GOVERNMENT

## 2024-03-01 LAB
RAD ONC ARIA COURSE ID: NORMAL
RAD ONC ARIA COURSE INTENT: NORMAL
RAD ONC ARIA COURSE LAST TREATMENT DATE: NORMAL
RAD ONC ARIA COURSE START DATE: NORMAL
RAD ONC ARIA COURSE TREATMENT ELAPSED DAYS: 9
RAD ONC ARIA FIRST TREATMENT DATE: NORMAL
RAD ONC ARIA PLAN FRACTIONS TREATED TO DATE: 8
RAD ONC ARIA PLAN ID: NORMAL
RAD ONC ARIA PLAN PRESCRIBED DOSE PER FRACTION: 2 GY
RAD ONC ARIA PLAN PRIMARY REFERENCE POINT: NORMAL
RAD ONC ARIA PLAN TOTAL FRACTIONS PRESCRIBED: 27
RAD ONC ARIA PLAN TOTAL PRESCRIBED DOSE: 5400 CGY
RAD ONC ARIA REFERENCE POINT DOSAGE GIVEN TO DATE: 16 GY
RAD ONC ARIA REFERENCE POINT ID: NORMAL
RAD ONC ARIA REFERENCE POINT SESSION DOSAGE GIVEN: 2 GY

## 2024-03-01 PROCEDURE — 77336 RADIATION PHYSICS CONSULT: CPT | Performed by: STUDENT IN AN ORGANIZED HEALTH CARE EDUCATION/TRAINING PROGRAM

## 2024-03-01 PROCEDURE — 77014 CHG CT GUIDANCE RADIATION THERAPY FLDS PLACEMENT: CPT | Performed by: STUDENT IN AN ORGANIZED HEALTH CARE EDUCATION/TRAINING PROGRAM

## 2024-03-01 PROCEDURE — 77385: CPT | Performed by: STUDENT IN AN ORGANIZED HEALTH CARE EDUCATION/TRAINING PROGRAM

## 2024-03-04 ENCOUNTER — HOSPITAL ENCOUNTER (OUTPATIENT)
Dept: RADIATION ONCOLOGY | Facility: HOSPITAL | Age: 74
Discharge: HOME | End: 2024-03-04
Payer: OTHER GOVERNMENT

## 2024-03-04 LAB
RAD ONC ARIA COURSE ID: NORMAL
RAD ONC ARIA COURSE INTENT: NORMAL
RAD ONC ARIA COURSE LAST TREATMENT DATE: NORMAL
RAD ONC ARIA COURSE START DATE: NORMAL
RAD ONC ARIA COURSE TREATMENT ELAPSED DAYS: 12
RAD ONC ARIA FIRST TREATMENT DATE: NORMAL
RAD ONC ARIA PLAN FRACTIONS TREATED TO DATE: 9
RAD ONC ARIA PLAN ID: NORMAL
RAD ONC ARIA PLAN PRESCRIBED DOSE PER FRACTION: 2 GY
RAD ONC ARIA PLAN PRIMARY REFERENCE POINT: NORMAL
RAD ONC ARIA PLAN TOTAL FRACTIONS PRESCRIBED: 27
RAD ONC ARIA PLAN TOTAL PRESCRIBED DOSE: 5400 CGY
RAD ONC ARIA REFERENCE POINT DOSAGE GIVEN TO DATE: 18 GY
RAD ONC ARIA REFERENCE POINT ID: NORMAL
RAD ONC ARIA REFERENCE POINT SESSION DOSAGE GIVEN: 2 GY

## 2024-03-04 PROCEDURE — 77014 CHG CT GUIDANCE RADIATION THERAPY FLDS PLACEMENT: CPT | Performed by: RADIOLOGY

## 2024-03-04 PROCEDURE — 77385: CPT | Performed by: RADIOLOGY

## 2024-03-05 ENCOUNTER — HOSPITAL ENCOUNTER (OUTPATIENT)
Dept: RADIATION ONCOLOGY | Facility: HOSPITAL | Age: 74
Discharge: HOME | End: 2024-03-05

## 2024-03-05 ENCOUNTER — RADIATION ONCOLOGY WEEKLY ASSESSMENT (OUTPATIENT)
Dept: RADIATION ONCOLOGY | Facility: HOSPITAL | Age: 74
End: 2024-03-05
Payer: OTHER GOVERNMENT

## 2024-03-05 VITALS
DIASTOLIC BLOOD PRESSURE: 86 MMHG | SYSTOLIC BLOOD PRESSURE: 134 MMHG | WEIGHT: 147 LBS | HEART RATE: 82 BPM | OXYGEN SATURATION: 98 %

## 2024-03-05 DIAGNOSIS — C61 PRIMARY MALIGNANT NEOPLASM OF PROSTATE: Primary | ICD-10-CM

## 2024-03-05 LAB
RAD ONC ARIA COURSE ID: NORMAL
RAD ONC ARIA COURSE INTENT: NORMAL
RAD ONC ARIA COURSE LAST TREATMENT DATE: NORMAL
RAD ONC ARIA COURSE START DATE: NORMAL
RAD ONC ARIA COURSE TREATMENT ELAPSED DAYS: 13
RAD ONC ARIA FIRST TREATMENT DATE: NORMAL
RAD ONC ARIA PLAN FRACTIONS TREATED TO DATE: 10
RAD ONC ARIA PLAN ID: NORMAL
RAD ONC ARIA PLAN PRESCRIBED DOSE PER FRACTION: 2 GY
RAD ONC ARIA PLAN PRIMARY REFERENCE POINT: NORMAL
RAD ONC ARIA PLAN TOTAL FRACTIONS PRESCRIBED: 27
RAD ONC ARIA PLAN TOTAL PRESCRIBED DOSE: 5400 CGY
RAD ONC ARIA REFERENCE POINT DOSAGE GIVEN TO DATE: 20 GY
RAD ONC ARIA REFERENCE POINT ID: NORMAL
RAD ONC ARIA REFERENCE POINT SESSION DOSAGE GIVEN: 2 GY

## 2024-03-05 PROCEDURE — 77014 CHG CT GUIDANCE RADIATION THERAPY FLDS PLACEMENT: CPT | Performed by: RADIOLOGY

## 2024-03-05 PROCEDURE — 77385: CPT | Performed by: RADIOLOGY

## 2024-03-05 NOTE — PROGRESS NOTES
Radiation Oncology  On-Treatment Note      Patient: Duc Hernandez    MRN: [id number]    Attending Physician: Rodri Wallace MD     Diagnosis:     ICD-10-CM ICD-9-CM   1. Primary malignant neoplasm of prostate  C61 185         Radiation Therapy Visit:  Continue radiation therapy, Dosimetry plan remains acceptable, Films reviewed and remains acceptable, Pain assessed, Pain management planned, Radiation dose schedule reviewed and remains acceptable, Radiation technique remains acceptable, and Symptoms within expected range    Radiation Treatments       Active   Plans   Prostate+SV   Most recent treatment: Dose planned: 200 cGy (fraction 10 on 3/5/2024)   Total: Dose planned: 5,400 cGy (27 fractions)   Elapsed Days: 13      Reference Points   54 sequential   Most recent treatment: Dose given: 200 cGy (on 3/5/2024)   Total: Dose given: 2,000 cGy   Elapsed Days: 13                      Physical Examination:  Vitals: Blood pressure 134/86, pulse 82, weight 66.7 kg (147 lb), SpO2 98%.  Pain Score    03/05/24 1334   PainSc: 0-No pain       Fully active, able to carry on all pre-disease performance without restriction = 0    We examined the relevant areas: yes  Findings are within the expected range for this stage of treatment: yes  -------------------------------------------------------------------------------------------------------------------    ACTION ITEMS:  Patient tolerating treatment well and as expected for this stage in their treatment and Continue radiation therapy as planned    Estimated Completion Date: 4/11/2024    Anticipatory guidance provided.

## 2024-03-06 ENCOUNTER — HOSPITAL ENCOUNTER (OUTPATIENT)
Dept: RADIATION ONCOLOGY | Facility: HOSPITAL | Age: 74
Discharge: HOME | End: 2024-03-06

## 2024-03-06 LAB
RAD ONC ARIA COURSE ID: NORMAL
RAD ONC ARIA COURSE INTENT: NORMAL
RAD ONC ARIA COURSE LAST TREATMENT DATE: NORMAL
RAD ONC ARIA COURSE START DATE: NORMAL
RAD ONC ARIA COURSE TREATMENT ELAPSED DAYS: 14
RAD ONC ARIA FIRST TREATMENT DATE: NORMAL
RAD ONC ARIA PLAN FRACTIONS TREATED TO DATE: 11
RAD ONC ARIA PLAN ID: NORMAL
RAD ONC ARIA PLAN PRESCRIBED DOSE PER FRACTION: 2 GY
RAD ONC ARIA PLAN PRIMARY REFERENCE POINT: NORMAL
RAD ONC ARIA PLAN TOTAL FRACTIONS PRESCRIBED: 27
RAD ONC ARIA PLAN TOTAL PRESCRIBED DOSE: 5400 CGY
RAD ONC ARIA REFERENCE POINT DOSAGE GIVEN TO DATE: 22 GY
RAD ONC ARIA REFERENCE POINT ID: NORMAL
RAD ONC ARIA REFERENCE POINT SESSION DOSAGE GIVEN: 2 GY

## 2024-03-06 PROCEDURE — 77014 CHG CT GUIDANCE RADIATION THERAPY FLDS PLACEMENT: CPT | Performed by: RADIOLOGY

## 2024-03-06 PROCEDURE — 77427 RADIATION TX MANAGEMENT X5: CPT | Performed by: INTERNAL MEDICINE

## 2024-03-06 PROCEDURE — 77385: CPT | Performed by: RADIOLOGY

## 2024-03-07 ENCOUNTER — HOSPITAL ENCOUNTER (OUTPATIENT)
Dept: RADIATION ONCOLOGY | Facility: HOSPITAL | Age: 74
Discharge: HOME | End: 2024-03-07

## 2024-03-07 LAB
RAD ONC ARIA COURSE ID: NORMAL
RAD ONC ARIA COURSE INTENT: NORMAL
RAD ONC ARIA COURSE LAST TREATMENT DATE: NORMAL
RAD ONC ARIA COURSE START DATE: NORMAL
RAD ONC ARIA COURSE TREATMENT ELAPSED DAYS: 15
RAD ONC ARIA FIRST TREATMENT DATE: NORMAL
RAD ONC ARIA PLAN FRACTIONS TREATED TO DATE: 12
RAD ONC ARIA PLAN ID: NORMAL
RAD ONC ARIA PLAN PRESCRIBED DOSE PER FRACTION: 2 GY
RAD ONC ARIA PLAN PRIMARY REFERENCE POINT: NORMAL
RAD ONC ARIA PLAN TOTAL FRACTIONS PRESCRIBED: 27
RAD ONC ARIA PLAN TOTAL PRESCRIBED DOSE: 5400 CGY
RAD ONC ARIA REFERENCE POINT DOSAGE GIVEN TO DATE: 24 GY
RAD ONC ARIA REFERENCE POINT ID: NORMAL
RAD ONC ARIA REFERENCE POINT SESSION DOSAGE GIVEN: 2 GY

## 2024-03-07 PROCEDURE — 77385: CPT | Performed by: INTERNAL MEDICINE

## 2024-03-07 PROCEDURE — 77014 CHG CT GUIDANCE RADIATION THERAPY FLDS PLACEMENT: CPT | Performed by: INTERNAL MEDICINE

## 2024-03-08 ENCOUNTER — HOSPITAL ENCOUNTER (OUTPATIENT)
Dept: RADIATION ONCOLOGY | Facility: HOSPITAL | Age: 74
Discharge: HOME | End: 2024-03-08

## 2024-03-08 LAB
RAD ONC ARIA COURSE ID: NORMAL
RAD ONC ARIA COURSE INTENT: NORMAL
RAD ONC ARIA COURSE LAST TREATMENT DATE: NORMAL
RAD ONC ARIA COURSE START DATE: NORMAL
RAD ONC ARIA COURSE TREATMENT ELAPSED DAYS: 16
RAD ONC ARIA FIRST TREATMENT DATE: NORMAL
RAD ONC ARIA PLAN FRACTIONS TREATED TO DATE: 13
RAD ONC ARIA PLAN ID: NORMAL
RAD ONC ARIA PLAN PRESCRIBED DOSE PER FRACTION: 2 GY
RAD ONC ARIA PLAN PRIMARY REFERENCE POINT: NORMAL
RAD ONC ARIA PLAN TOTAL FRACTIONS PRESCRIBED: 27
RAD ONC ARIA PLAN TOTAL PRESCRIBED DOSE: 5400 CGY
RAD ONC ARIA REFERENCE POINT DOSAGE GIVEN TO DATE: 26 GY
RAD ONC ARIA REFERENCE POINT ID: NORMAL
RAD ONC ARIA REFERENCE POINT SESSION DOSAGE GIVEN: 2 GY

## 2024-03-08 PROCEDURE — 77385: CPT | Performed by: RADIOLOGY

## 2024-03-08 PROCEDURE — 77014 CHG CT GUIDANCE RADIATION THERAPY FLDS PLACEMENT: CPT | Performed by: RADIOLOGY

## 2024-03-08 PROCEDURE — 77336 RADIATION PHYSICS CONSULT: CPT | Performed by: STUDENT IN AN ORGANIZED HEALTH CARE EDUCATION/TRAINING PROGRAM

## 2024-03-11 ENCOUNTER — HOSPITAL ENCOUNTER (OUTPATIENT)
Dept: RADIATION ONCOLOGY | Facility: HOSPITAL | Age: 74
Discharge: HOME | End: 2024-03-11
Payer: OTHER GOVERNMENT

## 2024-03-11 LAB
RAD ONC ARIA COURSE ID: NORMAL
RAD ONC ARIA COURSE INTENT: NORMAL
RAD ONC ARIA COURSE LAST TREATMENT DATE: NORMAL
RAD ONC ARIA COURSE START DATE: NORMAL
RAD ONC ARIA COURSE TREATMENT ELAPSED DAYS: 19
RAD ONC ARIA FIRST TREATMENT DATE: NORMAL
RAD ONC ARIA PLAN FRACTIONS TREATED TO DATE: 14
RAD ONC ARIA PLAN ID: NORMAL
RAD ONC ARIA PLAN PRESCRIBED DOSE PER FRACTION: 2 GY
RAD ONC ARIA PLAN PRIMARY REFERENCE POINT: NORMAL
RAD ONC ARIA PLAN TOTAL FRACTIONS PRESCRIBED: 27
RAD ONC ARIA PLAN TOTAL PRESCRIBED DOSE: 5400 CGY
RAD ONC ARIA REFERENCE POINT DOSAGE GIVEN TO DATE: 28 GY
RAD ONC ARIA REFERENCE POINT ID: NORMAL
RAD ONC ARIA REFERENCE POINT SESSION DOSAGE GIVEN: 2 GY

## 2024-03-11 PROCEDURE — 77385: CPT | Performed by: STUDENT IN AN ORGANIZED HEALTH CARE EDUCATION/TRAINING PROGRAM

## 2024-03-11 PROCEDURE — 77014 CHG CT GUIDANCE RADIATION THERAPY FLDS PLACEMENT: CPT | Performed by: STUDENT IN AN ORGANIZED HEALTH CARE EDUCATION/TRAINING PROGRAM

## 2024-03-12 ENCOUNTER — HOSPITAL ENCOUNTER (OUTPATIENT)
Dept: RADIATION ONCOLOGY | Facility: HOSPITAL | Age: 74
Discharge: HOME | End: 2024-03-12

## 2024-03-12 LAB
RAD ONC ARIA COURSE ID: NORMAL
RAD ONC ARIA COURSE INTENT: NORMAL
RAD ONC ARIA COURSE LAST TREATMENT DATE: NORMAL
RAD ONC ARIA COURSE START DATE: NORMAL
RAD ONC ARIA COURSE TREATMENT ELAPSED DAYS: 20
RAD ONC ARIA FIRST TREATMENT DATE: NORMAL
RAD ONC ARIA PLAN FRACTIONS TREATED TO DATE: 15
RAD ONC ARIA PLAN ID: NORMAL
RAD ONC ARIA PLAN PRESCRIBED DOSE PER FRACTION: 2 GY
RAD ONC ARIA PLAN PRIMARY REFERENCE POINT: NORMAL
RAD ONC ARIA PLAN TOTAL FRACTIONS PRESCRIBED: 27
RAD ONC ARIA PLAN TOTAL PRESCRIBED DOSE: 5400 CGY
RAD ONC ARIA REFERENCE POINT DOSAGE GIVEN TO DATE: 30 GY
RAD ONC ARIA REFERENCE POINT ID: NORMAL
RAD ONC ARIA REFERENCE POINT SESSION DOSAGE GIVEN: 2 GY

## 2024-03-12 PROCEDURE — 77014 CHG CT GUIDANCE RADIATION THERAPY FLDS PLACEMENT: CPT | Performed by: STUDENT IN AN ORGANIZED HEALTH CARE EDUCATION/TRAINING PROGRAM

## 2024-03-12 PROCEDURE — 77385: CPT | Performed by: STUDENT IN AN ORGANIZED HEALTH CARE EDUCATION/TRAINING PROGRAM

## 2024-03-13 ENCOUNTER — HOSPITAL ENCOUNTER (OUTPATIENT)
Dept: RADIATION ONCOLOGY | Facility: HOSPITAL | Age: 74
Discharge: HOME | End: 2024-03-13

## 2024-03-13 LAB
RAD ONC ARIA COURSE ID: NORMAL
RAD ONC ARIA COURSE INTENT: NORMAL
RAD ONC ARIA COURSE LAST TREATMENT DATE: NORMAL
RAD ONC ARIA COURSE START DATE: NORMAL
RAD ONC ARIA COURSE TREATMENT ELAPSED DAYS: 21
RAD ONC ARIA FIRST TREATMENT DATE: NORMAL
RAD ONC ARIA PLAN FRACTIONS TREATED TO DATE: 16
RAD ONC ARIA PLAN ID: NORMAL
RAD ONC ARIA PLAN PRESCRIBED DOSE PER FRACTION: 2 GY
RAD ONC ARIA PLAN PRIMARY REFERENCE POINT: NORMAL
RAD ONC ARIA PLAN TOTAL FRACTIONS PRESCRIBED: 27
RAD ONC ARIA PLAN TOTAL PRESCRIBED DOSE: 5400 CGY
RAD ONC ARIA REFERENCE POINT DOSAGE GIVEN TO DATE: 32 GY
RAD ONC ARIA REFERENCE POINT ID: NORMAL
RAD ONC ARIA REFERENCE POINT SESSION DOSAGE GIVEN: 2 GY

## 2024-03-13 PROCEDURE — 77427 RADIATION TX MANAGEMENT X5: CPT | Performed by: STUDENT IN AN ORGANIZED HEALTH CARE EDUCATION/TRAINING PROGRAM

## 2024-03-13 PROCEDURE — 77014 CHG CT GUIDANCE RADIATION THERAPY FLDS PLACEMENT: CPT | Performed by: STUDENT IN AN ORGANIZED HEALTH CARE EDUCATION/TRAINING PROGRAM

## 2024-03-13 PROCEDURE — 77385: CPT | Performed by: STUDENT IN AN ORGANIZED HEALTH CARE EDUCATION/TRAINING PROGRAM

## 2024-03-14 ENCOUNTER — HOSPITAL ENCOUNTER (OUTPATIENT)
Dept: RADIATION ONCOLOGY | Facility: HOSPITAL | Age: 74
Discharge: HOME | End: 2024-03-14

## 2024-03-14 ENCOUNTER — RADIATION ONCOLOGY WEEKLY ASSESSMENT (OUTPATIENT)
Dept: RADIATION ONCOLOGY | Facility: HOSPITAL | Age: 74
End: 2024-03-14
Payer: OTHER GOVERNMENT

## 2024-03-14 VITALS
WEIGHT: 149 LBS | SYSTOLIC BLOOD PRESSURE: 145 MMHG | DIASTOLIC BLOOD PRESSURE: 67 MMHG | OXYGEN SATURATION: 97 % | HEART RATE: 65 BPM

## 2024-03-14 DIAGNOSIS — C61 PRIMARY MALIGNANT NEOPLASM OF PROSTATE: Primary | ICD-10-CM

## 2024-03-14 LAB
RAD ONC ARIA COURSE ID: NORMAL
RAD ONC ARIA COURSE INTENT: NORMAL
RAD ONC ARIA COURSE LAST TREATMENT DATE: NORMAL
RAD ONC ARIA COURSE START DATE: NORMAL
RAD ONC ARIA COURSE TREATMENT ELAPSED DAYS: 22
RAD ONC ARIA FIRST TREATMENT DATE: NORMAL
RAD ONC ARIA PLAN FRACTIONS TREATED TO DATE: 17
RAD ONC ARIA PLAN ID: NORMAL
RAD ONC ARIA PLAN PRESCRIBED DOSE PER FRACTION: 2 GY
RAD ONC ARIA PLAN PRIMARY REFERENCE POINT: NORMAL
RAD ONC ARIA PLAN TOTAL FRACTIONS PRESCRIBED: 27
RAD ONC ARIA PLAN TOTAL PRESCRIBED DOSE: 5400 CGY
RAD ONC ARIA REFERENCE POINT DOSAGE GIVEN TO DATE: 34 GY
RAD ONC ARIA REFERENCE POINT ID: NORMAL
RAD ONC ARIA REFERENCE POINT SESSION DOSAGE GIVEN: 2 GY

## 2024-03-14 PROCEDURE — 77014 CHG CT GUIDANCE RADIATION THERAPY FLDS PLACEMENT: CPT | Performed by: STUDENT IN AN ORGANIZED HEALTH CARE EDUCATION/TRAINING PROGRAM

## 2024-03-14 PROCEDURE — 77385: CPT | Performed by: STUDENT IN AN ORGANIZED HEALTH CARE EDUCATION/TRAINING PROGRAM

## 2024-03-14 NOTE — PROGRESS NOTES
Radiation Oncology  On-Treatment Note      Patient: Duc Hernandez    MRN: [id number]    Attending Physician: Rodri Wallace MD     Diagnosis:     ICD-10-CM ICD-9-CM   1. Primary malignant neoplasm of prostate  C61 185       Radiation Therapy Visit:  Continue radiation therapy, Dosimetry plan remains acceptable, Films reviewed and remains acceptable, Pain assessed, Pain management planned, Radiation dose schedule reviewed and remains acceptable, Radiation technique remains acceptable, and Symptoms within expected range    Radiation Treatments       Active   Plans   Prostate+SV   Most recent treatment: Dose planned: 200 cGy (fraction 17 on 3/14/2024)   Total: Dose planned: 5,400 cGy (27 fractions)   Elapsed Days: 22      Reference Points   54 sequential   Most recent treatment: Dose given: 200 cGy (on 3/14/2024)   Total: Dose given: 3,400 cGy   Elapsed Days: 22                      Physical Examination:  Vitals: Blood pressure 145/67, pulse 65, weight 67.6 kg (149 lb), SpO2 97%.  Pain Score    03/14/24 1340   PainSc: 0-No pain       Fully active, able to carry on all pre-disease performance without restriction = 0    We examined the relevant areas: yes  Findings are within the expected range for this stage of treatment: yes  -------------------------------------------------------------------------------------------------------------------    ACTION ITEMS:  Patient tolerating treatment well and as expected for this stage in their treatment and Continue radiation therapy as planned    Imodium for diarrhea, azo for dysuria    Estimated Completion Date: 4/11/2024      Rodri Wallace MD  Radiation Oncology

## 2024-03-15 ENCOUNTER — HOSPITAL ENCOUNTER (OUTPATIENT)
Dept: RADIATION ONCOLOGY | Facility: HOSPITAL | Age: 74
Discharge: HOME | End: 2024-03-15

## 2024-03-15 LAB
RAD ONC ARIA COURSE ID: NORMAL
RAD ONC ARIA COURSE INTENT: NORMAL
RAD ONC ARIA COURSE LAST TREATMENT DATE: NORMAL
RAD ONC ARIA COURSE START DATE: NORMAL
RAD ONC ARIA COURSE TREATMENT ELAPSED DAYS: 23
RAD ONC ARIA FIRST TREATMENT DATE: NORMAL
RAD ONC ARIA PLAN FRACTIONS TREATED TO DATE: 18
RAD ONC ARIA PLAN ID: NORMAL
RAD ONC ARIA PLAN PRESCRIBED DOSE PER FRACTION: 2 GY
RAD ONC ARIA PLAN PRIMARY REFERENCE POINT: NORMAL
RAD ONC ARIA PLAN TOTAL FRACTIONS PRESCRIBED: 27
RAD ONC ARIA PLAN TOTAL PRESCRIBED DOSE: 5400 CGY
RAD ONC ARIA REFERENCE POINT DOSAGE GIVEN TO DATE: 36 GY
RAD ONC ARIA REFERENCE POINT ID: NORMAL
RAD ONC ARIA REFERENCE POINT SESSION DOSAGE GIVEN: 2 GY

## 2024-03-15 PROCEDURE — 77014 CHG CT GUIDANCE RADIATION THERAPY FLDS PLACEMENT: CPT | Performed by: STUDENT IN AN ORGANIZED HEALTH CARE EDUCATION/TRAINING PROGRAM

## 2024-03-15 PROCEDURE — 77336 RADIATION PHYSICS CONSULT: CPT | Performed by: STUDENT IN AN ORGANIZED HEALTH CARE EDUCATION/TRAINING PROGRAM

## 2024-03-15 PROCEDURE — 77385: CPT | Performed by: STUDENT IN AN ORGANIZED HEALTH CARE EDUCATION/TRAINING PROGRAM

## 2024-03-18 ENCOUNTER — RADIATION ONCOLOGY WEEKLY ASSESSMENT (OUTPATIENT)
Dept: RADIATION ONCOLOGY | Facility: HOSPITAL | Age: 74
End: 2024-03-18
Payer: OTHER GOVERNMENT

## 2024-03-18 ENCOUNTER — HOSPITAL ENCOUNTER (OUTPATIENT)
Dept: RADIATION ONCOLOGY | Facility: HOSPITAL | Age: 74
Discharge: HOME | End: 2024-03-18
Payer: OTHER GOVERNMENT

## 2024-03-18 VITALS
OXYGEN SATURATION: 93 % | DIASTOLIC BLOOD PRESSURE: 78 MMHG | SYSTOLIC BLOOD PRESSURE: 162 MMHG | HEART RATE: 67 BPM | WEIGHT: 150 LBS

## 2024-03-18 DIAGNOSIS — C61 PROSTATE CANCER: Primary | ICD-10-CM

## 2024-03-18 LAB
RAD ONC ARIA COURSE ID: NORMAL
RAD ONC ARIA COURSE INTENT: NORMAL
RAD ONC ARIA COURSE LAST TREATMENT DATE: NORMAL
RAD ONC ARIA COURSE START DATE: NORMAL
RAD ONC ARIA COURSE TREATMENT ELAPSED DAYS: 26
RAD ONC ARIA FIRST TREATMENT DATE: NORMAL
RAD ONC ARIA PLAN FRACTIONS TREATED TO DATE: 19
RAD ONC ARIA PLAN ID: NORMAL
RAD ONC ARIA PLAN PRESCRIBED DOSE PER FRACTION: 2 GY
RAD ONC ARIA PLAN PRIMARY REFERENCE POINT: NORMAL
RAD ONC ARIA PLAN TOTAL FRACTIONS PRESCRIBED: 27
RAD ONC ARIA PLAN TOTAL PRESCRIBED DOSE: 5400 CGY
RAD ONC ARIA REFERENCE POINT DOSAGE GIVEN TO DATE: 38 GY
RAD ONC ARIA REFERENCE POINT ID: NORMAL
RAD ONC ARIA REFERENCE POINT SESSION DOSAGE GIVEN: 2 GY

## 2024-03-18 PROCEDURE — 77014 CHG CT GUIDANCE RADIATION THERAPY FLDS PLACEMENT: CPT | Performed by: STUDENT IN AN ORGANIZED HEALTH CARE EDUCATION/TRAINING PROGRAM

## 2024-03-18 PROCEDURE — 77385: CPT | Performed by: STUDENT IN AN ORGANIZED HEALTH CARE EDUCATION/TRAINING PROGRAM

## 2024-03-18 NOTE — PROGRESS NOTES
Radiation Oncology  On-Treatment Note      Patient: Duc Hernandez    MRN: [id number]    Attending Physician: Rodri Wallace MD     Diagnosis:     ICD-10-CM ICD-9-CM   1. Prostate cancer  C61 185       Radiation Therapy Visit:  Continue radiation therapy, Dosimetry plan remains acceptable, Films reviewed and remains acceptable, Pain assessed, Pain management planned, Radiation dose schedule reviewed and remains acceptable, Radiation technique remains acceptable, and Symptoms within expected range    Radiation Treatments       Active   Plans   Prostate+SV   Most recent treatment: Dose planned: 200 cGy (fraction 19 on 3/18/2024)   Total: Dose planned: 5,400 cGy (27 fractions)   Elapsed Days: 26      Reference Points   54 sequential   Most recent treatment: Dose given: 200 cGy (on 3/18/2024)   Total: Dose given: 3,800 cGy   Elapsed Days: 26                      Physical Examination:  Vitals: Blood pressure 162/78, pulse 67, weight 68 kg (150 lb), SpO2 93%.  Pain Score    03/18/24 1409   PainSc: 0-No pain       Fully active, able to carry on all pre-disease performance without restriction = 0    We examined the relevant areas: yes  Findings are within the expected range for this stage of treatment: yes  -------------------------------------------------------------------------------------------------------------------    ACTION ITEMS:  Patient tolerating treatment well and as expected for this stage in their treatment and Continue radiation therapy as planned    Estimated Completion Date: 4/11/2024      Rodri Wallace MD  Radiation Oncology

## 2024-03-19 ENCOUNTER — HOSPITAL ENCOUNTER (OUTPATIENT)
Dept: RADIATION ONCOLOGY | Facility: HOSPITAL | Age: 74
Discharge: HOME | End: 2024-03-19

## 2024-03-19 LAB
RAD ONC ARIA COURSE ID: NORMAL
RAD ONC ARIA COURSE INTENT: NORMAL
RAD ONC ARIA COURSE LAST TREATMENT DATE: NORMAL
RAD ONC ARIA COURSE START DATE: NORMAL
RAD ONC ARIA COURSE TREATMENT ELAPSED DAYS: 27
RAD ONC ARIA FIRST TREATMENT DATE: NORMAL
RAD ONC ARIA PLAN FRACTIONS TREATED TO DATE: 20
RAD ONC ARIA PLAN ID: NORMAL
RAD ONC ARIA PLAN PRESCRIBED DOSE PER FRACTION: 2 GY
RAD ONC ARIA PLAN PRIMARY REFERENCE POINT: NORMAL
RAD ONC ARIA PLAN TOTAL FRACTIONS PRESCRIBED: 27
RAD ONC ARIA PLAN TOTAL PRESCRIBED DOSE: 5400 CGY
RAD ONC ARIA REFERENCE POINT DOSAGE GIVEN TO DATE: 40 GY
RAD ONC ARIA REFERENCE POINT ID: NORMAL
RAD ONC ARIA REFERENCE POINT SESSION DOSAGE GIVEN: 2 GY

## 2024-03-19 PROCEDURE — 77385: CPT | Performed by: STUDENT IN AN ORGANIZED HEALTH CARE EDUCATION/TRAINING PROGRAM

## 2024-03-19 PROCEDURE — 77014 CHG CT GUIDANCE RADIATION THERAPY FLDS PLACEMENT: CPT | Performed by: STUDENT IN AN ORGANIZED HEALTH CARE EDUCATION/TRAINING PROGRAM

## 2024-03-20 ENCOUNTER — HOSPITAL ENCOUNTER (OUTPATIENT)
Dept: RADIATION ONCOLOGY | Facility: HOSPITAL | Age: 74
Discharge: HOME | End: 2024-03-20

## 2024-03-20 LAB
RAD ONC ARIA COURSE ID: NORMAL
RAD ONC ARIA COURSE INTENT: NORMAL
RAD ONC ARIA COURSE LAST TREATMENT DATE: NORMAL
RAD ONC ARIA COURSE START DATE: NORMAL
RAD ONC ARIA COURSE TREATMENT ELAPSED DAYS: 28
RAD ONC ARIA FIRST TREATMENT DATE: NORMAL
RAD ONC ARIA PLAN FRACTIONS TREATED TO DATE: 21
RAD ONC ARIA PLAN ID: NORMAL
RAD ONC ARIA PLAN PRESCRIBED DOSE PER FRACTION: 2 GY
RAD ONC ARIA PLAN PRIMARY REFERENCE POINT: NORMAL
RAD ONC ARIA PLAN TOTAL FRACTIONS PRESCRIBED: 27
RAD ONC ARIA PLAN TOTAL PRESCRIBED DOSE: 5400 CGY
RAD ONC ARIA REFERENCE POINT DOSAGE GIVEN TO DATE: 42 GY
RAD ONC ARIA REFERENCE POINT ID: NORMAL
RAD ONC ARIA REFERENCE POINT SESSION DOSAGE GIVEN: 2 GY

## 2024-03-20 PROCEDURE — 77014 CHG CT GUIDANCE RADIATION THERAPY FLDS PLACEMENT: CPT | Performed by: STUDENT IN AN ORGANIZED HEALTH CARE EDUCATION/TRAINING PROGRAM

## 2024-03-20 PROCEDURE — 77385: CPT | Performed by: STUDENT IN AN ORGANIZED HEALTH CARE EDUCATION/TRAINING PROGRAM

## 2024-03-20 PROCEDURE — 77427 RADIATION TX MANAGEMENT X5: CPT | Performed by: STUDENT IN AN ORGANIZED HEALTH CARE EDUCATION/TRAINING PROGRAM

## 2024-03-21 ENCOUNTER — HOSPITAL ENCOUNTER (OUTPATIENT)
Dept: RADIATION ONCOLOGY | Facility: HOSPITAL | Age: 74
Discharge: HOME | End: 2024-03-21

## 2024-03-21 LAB
RAD ONC ARIA COURSE ID: NORMAL
RAD ONC ARIA COURSE INTENT: NORMAL
RAD ONC ARIA COURSE LAST TREATMENT DATE: NORMAL
RAD ONC ARIA COURSE START DATE: NORMAL
RAD ONC ARIA COURSE TREATMENT ELAPSED DAYS: 29
RAD ONC ARIA FIRST TREATMENT DATE: NORMAL
RAD ONC ARIA PLAN FRACTIONS TREATED TO DATE: 22
RAD ONC ARIA PLAN ID: NORMAL
RAD ONC ARIA PLAN PRESCRIBED DOSE PER FRACTION: 2 GY
RAD ONC ARIA PLAN PRIMARY REFERENCE POINT: NORMAL
RAD ONC ARIA PLAN TOTAL FRACTIONS PRESCRIBED: 27
RAD ONC ARIA PLAN TOTAL PRESCRIBED DOSE: 5400 CGY
RAD ONC ARIA REFERENCE POINT DOSAGE GIVEN TO DATE: 44 GY
RAD ONC ARIA REFERENCE POINT ID: NORMAL
RAD ONC ARIA REFERENCE POINT SESSION DOSAGE GIVEN: 2 GY

## 2024-03-21 PROCEDURE — 77385: CPT | Performed by: STUDENT IN AN ORGANIZED HEALTH CARE EDUCATION/TRAINING PROGRAM

## 2024-03-21 PROCEDURE — 77014 CHG CT GUIDANCE RADIATION THERAPY FLDS PLACEMENT: CPT | Performed by: STUDENT IN AN ORGANIZED HEALTH CARE EDUCATION/TRAINING PROGRAM

## 2024-03-22 ENCOUNTER — HOSPITAL ENCOUNTER (OUTPATIENT)
Dept: RADIATION ONCOLOGY | Facility: HOSPITAL | Age: 74
Discharge: HOME | End: 2024-03-22

## 2024-03-22 LAB
RAD ONC ARIA COURSE ID: NORMAL
RAD ONC ARIA COURSE INTENT: NORMAL
RAD ONC ARIA COURSE LAST TREATMENT DATE: NORMAL
RAD ONC ARIA COURSE START DATE: NORMAL
RAD ONC ARIA COURSE TREATMENT ELAPSED DAYS: 30
RAD ONC ARIA FIRST TREATMENT DATE: NORMAL
RAD ONC ARIA PLAN FRACTIONS TREATED TO DATE: 23
RAD ONC ARIA PLAN ID: NORMAL
RAD ONC ARIA PLAN PRESCRIBED DOSE PER FRACTION: 2 GY
RAD ONC ARIA PLAN PRIMARY REFERENCE POINT: NORMAL
RAD ONC ARIA PLAN TOTAL FRACTIONS PRESCRIBED: 27
RAD ONC ARIA PLAN TOTAL PRESCRIBED DOSE: 5400 CGY
RAD ONC ARIA REFERENCE POINT DOSAGE GIVEN TO DATE: 46 GY
RAD ONC ARIA REFERENCE POINT ID: NORMAL
RAD ONC ARIA REFERENCE POINT SESSION DOSAGE GIVEN: 2 GY

## 2024-03-22 PROCEDURE — 77336 RADIATION PHYSICS CONSULT: CPT | Performed by: STUDENT IN AN ORGANIZED HEALTH CARE EDUCATION/TRAINING PROGRAM

## 2024-03-22 PROCEDURE — 77385: CPT | Performed by: STUDENT IN AN ORGANIZED HEALTH CARE EDUCATION/TRAINING PROGRAM

## 2024-03-22 PROCEDURE — 77014 CHG CT GUIDANCE RADIATION THERAPY FLDS PLACEMENT: CPT | Performed by: STUDENT IN AN ORGANIZED HEALTH CARE EDUCATION/TRAINING PROGRAM

## 2024-03-25 ENCOUNTER — RADIATION ONCOLOGY WEEKLY ASSESSMENT (OUTPATIENT)
Dept: RADIATION ONCOLOGY | Facility: HOSPITAL | Age: 74
End: 2024-03-25
Payer: OTHER GOVERNMENT

## 2024-03-25 ENCOUNTER — HOSPITAL ENCOUNTER (OUTPATIENT)
Dept: RADIATION ONCOLOGY | Facility: HOSPITAL | Age: 74
Discharge: HOME | End: 2024-03-25
Payer: OTHER GOVERNMENT

## 2024-03-25 VITALS
OXYGEN SATURATION: 100 % | HEART RATE: 76 BPM | DIASTOLIC BLOOD PRESSURE: 71 MMHG | WEIGHT: 149 LBS | SYSTOLIC BLOOD PRESSURE: 148 MMHG

## 2024-03-25 DIAGNOSIS — C61 PROSTATE CANCER: Primary | ICD-10-CM

## 2024-03-25 LAB
RAD ONC ARIA COURSE ID: NORMAL
RAD ONC ARIA COURSE INTENT: NORMAL
RAD ONC ARIA COURSE LAST TREATMENT DATE: NORMAL
RAD ONC ARIA COURSE START DATE: NORMAL
RAD ONC ARIA COURSE TREATMENT ELAPSED DAYS: 33
RAD ONC ARIA FIRST TREATMENT DATE: NORMAL
RAD ONC ARIA PLAN FRACTIONS TREATED TO DATE: 24
RAD ONC ARIA PLAN ID: NORMAL
RAD ONC ARIA PLAN PRESCRIBED DOSE PER FRACTION: 2 GY
RAD ONC ARIA PLAN PRIMARY REFERENCE POINT: NORMAL
RAD ONC ARIA PLAN TOTAL FRACTIONS PRESCRIBED: 27
RAD ONC ARIA PLAN TOTAL PRESCRIBED DOSE: 5400 CGY
RAD ONC ARIA REFERENCE POINT DOSAGE GIVEN TO DATE: 48 GY
RAD ONC ARIA REFERENCE POINT ID: NORMAL
RAD ONC ARIA REFERENCE POINT SESSION DOSAGE GIVEN: 2 GY

## 2024-03-25 PROCEDURE — 77385: CPT | Performed by: STUDENT IN AN ORGANIZED HEALTH CARE EDUCATION/TRAINING PROGRAM

## 2024-03-25 PROCEDURE — 77014 CHG CT GUIDANCE RADIATION THERAPY FLDS PLACEMENT: CPT | Performed by: STUDENT IN AN ORGANIZED HEALTH CARE EDUCATION/TRAINING PROGRAM

## 2024-03-25 NOTE — PROGRESS NOTES
Radiation Oncology  On-Treatment Note      Patient: Duc Hernandez    MRN: [id number]    Attending Physician: Rodri Wallace MD     Diagnosis:     ICD-10-CM ICD-9-CM   1. Prostate cancer  C61 185       Radiation Therapy Visit:  Continue radiation therapy, Dosimetry plan remains acceptable, Films reviewed and remains acceptable, Pain assessed, Pain management planned, Radiation dose schedule reviewed and remains acceptable, Radiation technique remains acceptable, and Symptoms within expected range    Radiation Treatments       Active   Plans   Prostate+SV   Most recent treatment: Dose planned: 200 cGy (fraction 24 on 3/25/2024)   Total: Dose planned: 5,400 cGy (27 fractions)   Elapsed Days: 33      Reference Points   54 sequential   Most recent treatment: Dose given: 200 cGy (on 3/25/2024)   Total: Dose given: 4,800 cGy   Elapsed Days: 33                      Physical Examination:  Vitals: Blood pressure 148/71, pulse 76, weight 67.6 kg (149 lb), SpO2 100%.  Pain Score    03/25/24 1357   PainSc:   5   PainLoc: Groin       Fully active, able to carry on all pre-disease performance without restriction = 0    We examined the relevant areas: yes  Findings are within the expected range for this stage of treatment: yes  -------------------------------------------------------------------------------------------------------------------    ACTION ITEMS:  Patient tolerating treatment well and as expected for this stage in their treatment and Continue radiation therapy as planned    Using azo for dysuria    Estimated Completion Date: 4/11/2024      Rodri Wallace MD  Radiation Oncology

## 2024-03-26 ENCOUNTER — HOSPITAL ENCOUNTER (OUTPATIENT)
Dept: RADIATION ONCOLOGY | Facility: HOSPITAL | Age: 74
Discharge: HOME | End: 2024-03-26

## 2024-03-26 LAB
RAD ONC ARIA COURSE ID: NORMAL
RAD ONC ARIA COURSE INTENT: NORMAL
RAD ONC ARIA COURSE LAST TREATMENT DATE: NORMAL
RAD ONC ARIA COURSE START DATE: NORMAL
RAD ONC ARIA COURSE TREATMENT ELAPSED DAYS: 34
RAD ONC ARIA FIRST TREATMENT DATE: NORMAL
RAD ONC ARIA PLAN FRACTIONS TREATED TO DATE: 25
RAD ONC ARIA PLAN ID: NORMAL
RAD ONC ARIA PLAN PRESCRIBED DOSE PER FRACTION: 2 GY
RAD ONC ARIA PLAN PRIMARY REFERENCE POINT: NORMAL
RAD ONC ARIA PLAN TOTAL FRACTIONS PRESCRIBED: 27
RAD ONC ARIA PLAN TOTAL PRESCRIBED DOSE: 5400 CGY
RAD ONC ARIA REFERENCE POINT DOSAGE GIVEN TO DATE: 50 GY
RAD ONC ARIA REFERENCE POINT ID: NORMAL
RAD ONC ARIA REFERENCE POINT SESSION DOSAGE GIVEN: 2 GY

## 2024-03-26 PROCEDURE — 77014 CHG CT GUIDANCE RADIATION THERAPY FLDS PLACEMENT: CPT | Performed by: STUDENT IN AN ORGANIZED HEALTH CARE EDUCATION/TRAINING PROGRAM

## 2024-03-26 PROCEDURE — 77385: CPT | Performed by: STUDENT IN AN ORGANIZED HEALTH CARE EDUCATION/TRAINING PROGRAM

## 2024-03-27 ENCOUNTER — HOSPITAL ENCOUNTER (OUTPATIENT)
Dept: RADIATION ONCOLOGY | Facility: HOSPITAL | Age: 74
Discharge: HOME | End: 2024-03-27

## 2024-03-27 LAB
RAD ONC ARIA COURSE ID: NORMAL
RAD ONC ARIA COURSE INTENT: NORMAL
RAD ONC ARIA COURSE LAST TREATMENT DATE: NORMAL
RAD ONC ARIA COURSE START DATE: NORMAL
RAD ONC ARIA COURSE TREATMENT ELAPSED DAYS: 35
RAD ONC ARIA FIRST TREATMENT DATE: NORMAL
RAD ONC ARIA PLAN FRACTIONS TREATED TO DATE: 26
RAD ONC ARIA PLAN ID: NORMAL
RAD ONC ARIA PLAN PRESCRIBED DOSE PER FRACTION: 2 GY
RAD ONC ARIA PLAN PRIMARY REFERENCE POINT: NORMAL
RAD ONC ARIA PLAN TOTAL FRACTIONS PRESCRIBED: 27
RAD ONC ARIA PLAN TOTAL PRESCRIBED DOSE: 5400 CGY
RAD ONC ARIA REFERENCE POINT DOSAGE GIVEN TO DATE: 52 GY
RAD ONC ARIA REFERENCE POINT ID: NORMAL
RAD ONC ARIA REFERENCE POINT SESSION DOSAGE GIVEN: 2 GY

## 2024-03-27 PROCEDURE — 77385: CPT | Performed by: STUDENT IN AN ORGANIZED HEALTH CARE EDUCATION/TRAINING PROGRAM

## 2024-03-28 ENCOUNTER — HOSPITAL ENCOUNTER (OUTPATIENT)
Dept: RADIATION ONCOLOGY | Facility: HOSPITAL | Age: 74
Discharge: HOME | End: 2024-03-28

## 2024-03-28 LAB
RAD ONC ARIA COURSE ID: NORMAL
RAD ONC ARIA COURSE INTENT: NORMAL
RAD ONC ARIA COURSE LAST TREATMENT DATE: NORMAL
RAD ONC ARIA COURSE START DATE: NORMAL
RAD ONC ARIA COURSE TREATMENT ELAPSED DAYS: 36
RAD ONC ARIA FIRST TREATMENT DATE: NORMAL
RAD ONC ARIA PLAN FRACTIONS TREATED TO DATE: 27
RAD ONC ARIA PLAN ID: NORMAL
RAD ONC ARIA PLAN PRESCRIBED DOSE PER FRACTION: 2 GY
RAD ONC ARIA PLAN PRIMARY REFERENCE POINT: NORMAL
RAD ONC ARIA PLAN TOTAL FRACTIONS PRESCRIBED: 27
RAD ONC ARIA PLAN TOTAL PRESCRIBED DOSE: 5400 CGY
RAD ONC ARIA REFERENCE POINT DOSAGE GIVEN TO DATE: 54 GY
RAD ONC ARIA REFERENCE POINT ID: NORMAL
RAD ONC ARIA REFERENCE POINT SESSION DOSAGE GIVEN: 2 GY

## 2024-03-28 PROCEDURE — 77385: CPT | Performed by: STUDENT IN AN ORGANIZED HEALTH CARE EDUCATION/TRAINING PROGRAM

## 2024-03-29 ENCOUNTER — HOSPITAL ENCOUNTER (OUTPATIENT)
Dept: RADIATION ONCOLOGY | Facility: HOSPITAL | Age: 74
Discharge: HOME | End: 2024-03-29

## 2024-03-29 LAB
RAD ONC ARIA COURSE ID: NORMAL
RAD ONC ARIA COURSE INTENT: NORMAL
RAD ONC ARIA COURSE LAST TREATMENT DATE: NORMAL
RAD ONC ARIA COURSE START DATE: NORMAL
RAD ONC ARIA COURSE TREATMENT ELAPSED DAYS: 37
RAD ONC ARIA FIRST TREATMENT DATE: NORMAL
RAD ONC ARIA PLAN FRACTIONS TREATED TO DATE: 1
RAD ONC ARIA PLAN ID: NORMAL
RAD ONC ARIA PLAN PRESCRIBED DOSE PER FRACTION: 2 GY
RAD ONC ARIA PLAN PRIMARY REFERENCE POINT: NORMAL
RAD ONC ARIA PLAN TOTAL FRACTIONS PRESCRIBED: 10
RAD ONC ARIA PLAN TOTAL PRESCRIBED DOSE: 2000 CGY
RAD ONC ARIA REFERENCE POINT DOSAGE GIVEN TO DATE: 2 GY
RAD ONC ARIA REFERENCE POINT ID: NORMAL
RAD ONC ARIA REFERENCE POINT SESSION DOSAGE GIVEN: 2 GY

## 2024-03-29 PROCEDURE — 77385: CPT | Performed by: STUDENT IN AN ORGANIZED HEALTH CARE EDUCATION/TRAINING PROGRAM

## 2024-03-29 PROCEDURE — 77336 RADIATION PHYSICS CONSULT: CPT | Performed by: STUDENT IN AN ORGANIZED HEALTH CARE EDUCATION/TRAINING PROGRAM

## 2024-04-01 ENCOUNTER — RADIATION ONCOLOGY WEEKLY ASSESSMENT (OUTPATIENT)
Dept: RADIATION ONCOLOGY | Facility: HOSPITAL | Age: 74
End: 2024-04-01
Payer: OTHER GOVERNMENT

## 2024-04-01 ENCOUNTER — HOSPITAL ENCOUNTER (OUTPATIENT)
Dept: RADIATION ONCOLOGY | Facility: HOSPITAL | Age: 74
Setting detail: RADIATION/ONCOLOGY SERIES
End: 2024-04-01
Payer: OTHER GOVERNMENT

## 2024-04-01 ENCOUNTER — HOSPITAL ENCOUNTER (OUTPATIENT)
Dept: RADIATION ONCOLOGY | Facility: HOSPITAL | Age: 74
Discharge: HOME | End: 2024-04-01
Payer: OTHER GOVERNMENT

## 2024-04-01 VITALS
WEIGHT: 147.2 LBS | DIASTOLIC BLOOD PRESSURE: 66 MMHG | HEART RATE: 62 BPM | SYSTOLIC BLOOD PRESSURE: 122 MMHG | OXYGEN SATURATION: 96 %

## 2024-04-01 DIAGNOSIS — C61 PROSTATE CANCER: Primary | ICD-10-CM

## 2024-04-01 LAB
RAD ONC ARIA COURSE ID: NORMAL
RAD ONC ARIA COURSE INTENT: NORMAL
RAD ONC ARIA COURSE LAST TREATMENT DATE: NORMAL
RAD ONC ARIA COURSE START DATE: NORMAL
RAD ONC ARIA COURSE TREATMENT ELAPSED DAYS: 40
RAD ONC ARIA FIRST TREATMENT DATE: NORMAL
RAD ONC ARIA PLAN FRACTIONS TREATED TO DATE: 2
RAD ONC ARIA PLAN ID: NORMAL
RAD ONC ARIA PLAN PRESCRIBED DOSE PER FRACTION: 2 GY
RAD ONC ARIA PLAN PRIMARY REFERENCE POINT: NORMAL
RAD ONC ARIA PLAN TOTAL FRACTIONS PRESCRIBED: 10
RAD ONC ARIA PLAN TOTAL PRESCRIBED DOSE: 2000 CGY
RAD ONC ARIA REFERENCE POINT DOSAGE GIVEN TO DATE: 4 GY
RAD ONC ARIA REFERENCE POINT ID: NORMAL
RAD ONC ARIA REFERENCE POINT SESSION DOSAGE GIVEN: 2 GY

## 2024-04-01 PROCEDURE — 77385: CPT | Performed by: STUDENT IN AN ORGANIZED HEALTH CARE EDUCATION/TRAINING PROGRAM

## 2024-04-01 PROCEDURE — 77014 CHG CT GUIDANCE RADIATION THERAPY FLDS PLACEMENT: CPT | Performed by: STUDENT IN AN ORGANIZED HEALTH CARE EDUCATION/TRAINING PROGRAM

## 2024-04-01 NOTE — PROGRESS NOTES
Radiation Oncology  On-Treatment Note      Patient: Duc Hernandez    MRN: [id number]    Attending Physician: Rodri Wallace MD     Diagnosis:     ICD-10-CM ICD-9-CM   1. Prostate cancer  C61 185       Radiation Therapy Visit:  Continue radiation therapy, Dosimetry plan remains acceptable, Films reviewed and remains acceptable, Pain assessed, Pain management planned, Radiation dose schedule reviewed and remains acceptable, Radiation technique remains acceptable, and Symptoms within expected range    Radiation Treatments       Active   Plans   [id number]   Most recent treatment: Dose planned: 200 cGy (fraction 2 on 4/1/2024)   Total: Dose planned: 2,000 cGy (10 fractions)   Elapsed Days: 40      Reference Points   54 sequential   Most recent treatment: Dose given: 200 cGy (on 3/28/2024)   Total: Dose given: 5,400 cGy   Elapsed Days: 36      74 sequential   Most recent treatment: Dose given: 200 cGy (on 4/1/2024)   Total: Dose given: 400 cGy   Elapsed Days: 40                      Physical Examination:  Vitals: Blood pressure 122/66, pulse 62, weight 66.8 kg (147 lb 3.2 oz), SpO2 96%.  Pain Score    04/01/24 1420   PainSc: 0-No pain       Fully active, able to carry on all pre-disease performance without restriction = 0    We examined the relevant areas: yes  Findings are within the expected range for this stage of treatment: yes  -------------------------------------------------------------------------------------------------------------------    ACTION ITEMS:  Patient tolerating treatment well and as expected for this stage in their treatment and Continue radiation therapy as planned    Estimated Completion Date: 4/11/2024      Rodri Wallace MD  Radiation Oncology

## 2024-04-02 ENCOUNTER — HOSPITAL ENCOUNTER (OUTPATIENT)
Dept: RADIATION ONCOLOGY | Facility: HOSPITAL | Age: 74
Discharge: HOME | End: 2024-04-02

## 2024-04-02 LAB
RAD ONC ARIA COURSE ID: NORMAL
RAD ONC ARIA COURSE INTENT: NORMAL
RAD ONC ARIA COURSE LAST TREATMENT DATE: NORMAL
RAD ONC ARIA COURSE START DATE: NORMAL
RAD ONC ARIA COURSE TREATMENT ELAPSED DAYS: 41
RAD ONC ARIA FIRST TREATMENT DATE: NORMAL
RAD ONC ARIA PLAN FRACTIONS TREATED TO DATE: 3
RAD ONC ARIA PLAN ID: NORMAL
RAD ONC ARIA PLAN PRESCRIBED DOSE PER FRACTION: 2 GY
RAD ONC ARIA PLAN PRIMARY REFERENCE POINT: NORMAL
RAD ONC ARIA PLAN TOTAL FRACTIONS PRESCRIBED: 10
RAD ONC ARIA PLAN TOTAL PRESCRIBED DOSE: 2000 CGY
RAD ONC ARIA REFERENCE POINT DOSAGE GIVEN TO DATE: 6 GY
RAD ONC ARIA REFERENCE POINT ID: NORMAL
RAD ONC ARIA REFERENCE POINT SESSION DOSAGE GIVEN: 2 GY

## 2024-04-02 PROCEDURE — 77385: CPT | Performed by: STUDENT IN AN ORGANIZED HEALTH CARE EDUCATION/TRAINING PROGRAM

## 2024-04-02 PROCEDURE — 77014 CHG CT GUIDANCE RADIATION THERAPY FLDS PLACEMENT: CPT | Performed by: STUDENT IN AN ORGANIZED HEALTH CARE EDUCATION/TRAINING PROGRAM

## 2024-04-03 ENCOUNTER — HOSPITAL ENCOUNTER (OUTPATIENT)
Dept: RADIATION ONCOLOGY | Facility: HOSPITAL | Age: 74
Discharge: HOME | End: 2024-04-03

## 2024-04-03 LAB
RAD ONC ARIA COURSE ID: NORMAL
RAD ONC ARIA COURSE INTENT: NORMAL
RAD ONC ARIA COURSE LAST TREATMENT DATE: NORMAL
RAD ONC ARIA COURSE START DATE: NORMAL
RAD ONC ARIA COURSE TREATMENT ELAPSED DAYS: 42
RAD ONC ARIA FIRST TREATMENT DATE: NORMAL
RAD ONC ARIA PLAN FRACTIONS TREATED TO DATE: 4
RAD ONC ARIA PLAN ID: NORMAL
RAD ONC ARIA PLAN PRESCRIBED DOSE PER FRACTION: 2 GY
RAD ONC ARIA PLAN PRIMARY REFERENCE POINT: NORMAL
RAD ONC ARIA PLAN TOTAL FRACTIONS PRESCRIBED: 10
RAD ONC ARIA PLAN TOTAL PRESCRIBED DOSE: 2000 CGY
RAD ONC ARIA REFERENCE POINT DOSAGE GIVEN TO DATE: 8 GY
RAD ONC ARIA REFERENCE POINT ID: NORMAL
RAD ONC ARIA REFERENCE POINT SESSION DOSAGE GIVEN: 2 GY

## 2024-04-03 PROCEDURE — 77427 RADIATION TX MANAGEMENT X5: CPT | Performed by: STUDENT IN AN ORGANIZED HEALTH CARE EDUCATION/TRAINING PROGRAM

## 2024-04-03 PROCEDURE — 77014 CHG CT GUIDANCE RADIATION THERAPY FLDS PLACEMENT: CPT | Performed by: STUDENT IN AN ORGANIZED HEALTH CARE EDUCATION/TRAINING PROGRAM

## 2024-04-03 PROCEDURE — 77385: CPT | Performed by: STUDENT IN AN ORGANIZED HEALTH CARE EDUCATION/TRAINING PROGRAM

## 2024-04-04 ENCOUNTER — HOSPITAL ENCOUNTER (OUTPATIENT)
Dept: RADIATION ONCOLOGY | Facility: HOSPITAL | Age: 74
Discharge: HOME | End: 2024-04-04

## 2024-04-04 LAB
RAD ONC ARIA COURSE ID: NORMAL
RAD ONC ARIA COURSE INTENT: NORMAL
RAD ONC ARIA COURSE LAST TREATMENT DATE: NORMAL
RAD ONC ARIA COURSE START DATE: NORMAL
RAD ONC ARIA COURSE TREATMENT ELAPSED DAYS: 43
RAD ONC ARIA FIRST TREATMENT DATE: NORMAL
RAD ONC ARIA PLAN FRACTIONS TREATED TO DATE: 5
RAD ONC ARIA PLAN ID: NORMAL
RAD ONC ARIA PLAN PRESCRIBED DOSE PER FRACTION: 2 GY
RAD ONC ARIA PLAN PRIMARY REFERENCE POINT: NORMAL
RAD ONC ARIA PLAN TOTAL FRACTIONS PRESCRIBED: 10
RAD ONC ARIA PLAN TOTAL PRESCRIBED DOSE: 2000 CGY
RAD ONC ARIA REFERENCE POINT DOSAGE GIVEN TO DATE: 10 GY
RAD ONC ARIA REFERENCE POINT ID: NORMAL
RAD ONC ARIA REFERENCE POINT SESSION DOSAGE GIVEN: 2 GY

## 2024-04-04 PROCEDURE — 77385: CPT | Performed by: STUDENT IN AN ORGANIZED HEALTH CARE EDUCATION/TRAINING PROGRAM

## 2024-04-04 PROCEDURE — 77014 CHG CT GUIDANCE RADIATION THERAPY FLDS PLACEMENT: CPT | Performed by: STUDENT IN AN ORGANIZED HEALTH CARE EDUCATION/TRAINING PROGRAM

## 2024-04-05 ENCOUNTER — HOSPITAL ENCOUNTER (OUTPATIENT)
Dept: RADIATION ONCOLOGY | Facility: HOSPITAL | Age: 74
Discharge: HOME | End: 2024-04-05

## 2024-04-05 LAB
RAD ONC ARIA COURSE ID: NORMAL
RAD ONC ARIA COURSE INTENT: NORMAL
RAD ONC ARIA COURSE LAST TREATMENT DATE: NORMAL
RAD ONC ARIA COURSE START DATE: NORMAL
RAD ONC ARIA COURSE TREATMENT ELAPSED DAYS: 44
RAD ONC ARIA FIRST TREATMENT DATE: NORMAL
RAD ONC ARIA PLAN FRACTIONS TREATED TO DATE: 6
RAD ONC ARIA PLAN ID: NORMAL
RAD ONC ARIA PLAN PRESCRIBED DOSE PER FRACTION: 2 GY
RAD ONC ARIA PLAN PRIMARY REFERENCE POINT: NORMAL
RAD ONC ARIA PLAN TOTAL FRACTIONS PRESCRIBED: 10
RAD ONC ARIA PLAN TOTAL PRESCRIBED DOSE: 2000 CGY
RAD ONC ARIA REFERENCE POINT DOSAGE GIVEN TO DATE: 12 GY
RAD ONC ARIA REFERENCE POINT ID: NORMAL
RAD ONC ARIA REFERENCE POINT SESSION DOSAGE GIVEN: 2 GY

## 2024-04-05 PROCEDURE — 77336 RADIATION PHYSICS CONSULT: CPT | Performed by: STUDENT IN AN ORGANIZED HEALTH CARE EDUCATION/TRAINING PROGRAM

## 2024-04-05 PROCEDURE — 77014 CHG CT GUIDANCE RADIATION THERAPY FLDS PLACEMENT: CPT | Performed by: STUDENT IN AN ORGANIZED HEALTH CARE EDUCATION/TRAINING PROGRAM

## 2024-04-05 PROCEDURE — 77385: CPT | Performed by: STUDENT IN AN ORGANIZED HEALTH CARE EDUCATION/TRAINING PROGRAM

## 2024-04-08 ENCOUNTER — RADIATION ONCOLOGY WEEKLY ASSESSMENT (OUTPATIENT)
Dept: RADIATION ONCOLOGY | Facility: HOSPITAL | Age: 74
End: 2024-04-08
Payer: OTHER GOVERNMENT

## 2024-04-08 ENCOUNTER — HOSPITAL ENCOUNTER (OUTPATIENT)
Dept: RADIATION ONCOLOGY | Facility: HOSPITAL | Age: 74
Discharge: HOME | End: 2024-04-08
Payer: OTHER GOVERNMENT

## 2024-04-08 VITALS
HEART RATE: 96 BPM | SYSTOLIC BLOOD PRESSURE: 162 MMHG | DIASTOLIC BLOOD PRESSURE: 88 MMHG | OXYGEN SATURATION: 95 % | WEIGHT: 145.4 LBS

## 2024-04-08 DIAGNOSIS — C61 PROSTATE CANCER: Primary | ICD-10-CM

## 2024-04-08 LAB
RAD ONC ARIA COURSE ID: NORMAL
RAD ONC ARIA COURSE INTENT: NORMAL
RAD ONC ARIA COURSE LAST TREATMENT DATE: NORMAL
RAD ONC ARIA COURSE START DATE: NORMAL
RAD ONC ARIA COURSE TREATMENT ELAPSED DAYS: 47
RAD ONC ARIA FIRST TREATMENT DATE: NORMAL
RAD ONC ARIA PLAN FRACTIONS TREATED TO DATE: 7
RAD ONC ARIA PLAN ID: NORMAL
RAD ONC ARIA PLAN PRESCRIBED DOSE PER FRACTION: 2 GY
RAD ONC ARIA PLAN PRIMARY REFERENCE POINT: NORMAL
RAD ONC ARIA PLAN TOTAL FRACTIONS PRESCRIBED: 10
RAD ONC ARIA PLAN TOTAL PRESCRIBED DOSE: 2000 CGY
RAD ONC ARIA REFERENCE POINT DOSAGE GIVEN TO DATE: 14 GY
RAD ONC ARIA REFERENCE POINT ID: NORMAL
RAD ONC ARIA REFERENCE POINT SESSION DOSAGE GIVEN: 2 GY

## 2024-04-08 PROCEDURE — 77014 CHG CT GUIDANCE RADIATION THERAPY FLDS PLACEMENT: CPT | Performed by: STUDENT IN AN ORGANIZED HEALTH CARE EDUCATION/TRAINING PROGRAM

## 2024-04-08 PROCEDURE — 77385: CPT | Performed by: STUDENT IN AN ORGANIZED HEALTH CARE EDUCATION/TRAINING PROGRAM

## 2024-04-08 NOTE — PROGRESS NOTES
Radiation Oncology  On-Treatment Note      Patient: Duc Hernandez    MRN: [id number]    Attending Physician: Rodri Wallace MD     Diagnosis:     ICD-10-CM ICD-9-CM   1. Prostate cancer  C61 185       Radiation Therapy Visit:  Continue radiation therapy, Dosimetry plan remains acceptable, Films reviewed and remains acceptable, Pain assessed, Pain management planned, Radiation dose schedule reviewed and remains acceptable, Radiation technique remains acceptable, and Symptoms within expected range    Radiation Treatments       Active   Plans   [id number]   Most recent treatment: Dose planned: 200 cGy (fraction 7 on 4/8/2024)   Total: Dose planned: 2,000 cGy (10 fractions)   Elapsed Days: 47      Reference Points   54 sequential   Most recent treatment: Dose given: 200 cGy (on 3/28/2024)   Total: Dose given: 5,400 cGy   Elapsed Days: 36      74 sequential   Most recent treatment: Dose given: 200 cGy (on 4/8/2024)   Total: Dose given: 1,400 cGy   Elapsed Days: 47                      Physical Examination:  Vitals: Blood pressure 162/88, pulse 96, weight 66 kg (145 lb 6.4 oz), SpO2 95%.  Pain Score    04/08/24 1359   PainSc: 0-No pain       Fully active, able to carry on all pre-disease performance without restriction = 0    We examined the relevant areas: yes  Findings are within the expected range for this stage of treatment: yes  -------------------------------------------------------------------------------------------------------------------    ACTION ITEMS:  Patient tolerating treatment well and as expected for this stage in their treatment and Continue radiation therapy as planned    Recommended scheduling imodium for diarrhea symptoms  May be having inguinal hernia on the left, will order US    Estimated Completion Date: 4/11/2024      Rodri Wallace MD  Radiation Oncology

## 2024-04-09 ENCOUNTER — HOSPITAL ENCOUNTER (OUTPATIENT)
Dept: RADIATION ONCOLOGY | Facility: HOSPITAL | Age: 74
Discharge: HOME | End: 2024-04-09

## 2024-04-09 DIAGNOSIS — C61 PROSTATE CANCER: Primary | ICD-10-CM

## 2024-04-09 LAB
RAD ONC ARIA COURSE ID: NORMAL
RAD ONC ARIA COURSE INTENT: NORMAL
RAD ONC ARIA COURSE LAST TREATMENT DATE: NORMAL
RAD ONC ARIA COURSE START DATE: NORMAL
RAD ONC ARIA COURSE TREATMENT ELAPSED DAYS: 48
RAD ONC ARIA FIRST TREATMENT DATE: NORMAL
RAD ONC ARIA PLAN FRACTIONS TREATED TO DATE: 8
RAD ONC ARIA PLAN ID: NORMAL
RAD ONC ARIA PLAN PRESCRIBED DOSE PER FRACTION: 2 GY
RAD ONC ARIA PLAN PRIMARY REFERENCE POINT: NORMAL
RAD ONC ARIA PLAN TOTAL FRACTIONS PRESCRIBED: 10
RAD ONC ARIA PLAN TOTAL PRESCRIBED DOSE: 2000 CGY
RAD ONC ARIA REFERENCE POINT DOSAGE GIVEN TO DATE: 16 GY
RAD ONC ARIA REFERENCE POINT ID: NORMAL
RAD ONC ARIA REFERENCE POINT SESSION DOSAGE GIVEN: 2 GY

## 2024-04-09 PROCEDURE — 77385: CPT | Performed by: STUDENT IN AN ORGANIZED HEALTH CARE EDUCATION/TRAINING PROGRAM

## 2024-04-09 PROCEDURE — 77014 CHG CT GUIDANCE RADIATION THERAPY FLDS PLACEMENT: CPT | Performed by: STUDENT IN AN ORGANIZED HEALTH CARE EDUCATION/TRAINING PROGRAM

## 2024-04-10 ENCOUNTER — HOSPITAL ENCOUNTER (OUTPATIENT)
Dept: RADIATION ONCOLOGY | Facility: HOSPITAL | Age: 74
Discharge: HOME | End: 2024-04-10

## 2024-04-10 LAB
RAD ONC ARIA COURSE ID: NORMAL
RAD ONC ARIA COURSE INTENT: NORMAL
RAD ONC ARIA COURSE LAST TREATMENT DATE: NORMAL
RAD ONC ARIA COURSE START DATE: NORMAL
RAD ONC ARIA COURSE TREATMENT ELAPSED DAYS: 49
RAD ONC ARIA FIRST TREATMENT DATE: NORMAL
RAD ONC ARIA PLAN FRACTIONS TREATED TO DATE: 9
RAD ONC ARIA PLAN ID: NORMAL
RAD ONC ARIA PLAN PRESCRIBED DOSE PER FRACTION: 2 GY
RAD ONC ARIA PLAN PRIMARY REFERENCE POINT: NORMAL
RAD ONC ARIA PLAN TOTAL FRACTIONS PRESCRIBED: 10
RAD ONC ARIA PLAN TOTAL PRESCRIBED DOSE: 2000 CGY
RAD ONC ARIA REFERENCE POINT DOSAGE GIVEN TO DATE: 18 GY
RAD ONC ARIA REFERENCE POINT ID: NORMAL
RAD ONC ARIA REFERENCE POINT SESSION DOSAGE GIVEN: 2 GY

## 2024-04-10 PROCEDURE — 77385: CPT | Performed by: STUDENT IN AN ORGANIZED HEALTH CARE EDUCATION/TRAINING PROGRAM

## 2024-04-11 ENCOUNTER — TREATMENT (OUTPATIENT)
Dept: RADIATION ONCOLOGY | Facility: HOSPITAL | Age: 74
End: 2024-04-11
Payer: OTHER GOVERNMENT

## 2024-04-11 ENCOUNTER — HOSPITAL ENCOUNTER (OUTPATIENT)
Dept: RADIATION ONCOLOGY | Facility: HOSPITAL | Age: 74
Discharge: HOME | End: 2024-04-11

## 2024-04-11 DIAGNOSIS — C61 PRIMARY MALIGNANT NEOPLASM OF PROSTATE: Primary | ICD-10-CM

## 2024-04-11 LAB
RAD ONC ARIA COURSE ID: NORMAL
RAD ONC ARIA COURSE INTENT: NORMAL
RAD ONC ARIA COURSE LAST TREATMENT DATE: NORMAL
RAD ONC ARIA COURSE START DATE: NORMAL
RAD ONC ARIA COURSE TREATMENT ELAPSED DAYS: 50
RAD ONC ARIA FIRST TREATMENT DATE: NORMAL
RAD ONC ARIA PLAN FRACTIONS TREATED TO DATE: 10
RAD ONC ARIA PLAN ID: NORMAL
RAD ONC ARIA PLAN PRESCRIBED DOSE PER FRACTION: 2 GY
RAD ONC ARIA PLAN PRIMARY REFERENCE POINT: NORMAL
RAD ONC ARIA PLAN TOTAL FRACTIONS PRESCRIBED: 10
RAD ONC ARIA PLAN TOTAL PRESCRIBED DOSE: 2000 CGY
RAD ONC ARIA REFERENCE POINT DOSAGE GIVEN TO DATE: 20 GY
RAD ONC ARIA REFERENCE POINT ID: NORMAL
RAD ONC ARIA REFERENCE POINT SESSION DOSAGE GIVEN: 2 GY

## 2024-04-11 PROCEDURE — 77385: CPT | Performed by: STUDENT IN AN ORGANIZED HEALTH CARE EDUCATION/TRAINING PROGRAM

## 2024-04-11 PROCEDURE — 77386: CPT | Performed by: STUDENT IN AN ORGANIZED HEALTH CARE EDUCATION/TRAINING PROGRAM

## 2024-04-15 NOTE — PROGRESS NOTES
Radiation Treatment Summary Note      Patient Name: Duc Hernandez  : 1950    Attending Provider: Rodri Wallace MD      Diagnosis:     ICD-10-CM ICD-9-CM   1. Primary malignant neoplasm of prostate  C61 185       Radiation Start Date: 2042    Radiation Completion Date: 2024      Prescription:     Sequential Boost    Site: Prostate and SV  Laterality: N/A  Total Dose: 5400cGy  Dose per Fraction: 200cGy  Total Fractions: 27  Daily or BID: Daily  Modality: Photon  Technique: IMRT/VMAT/Rapid Arc  Bolus: No      Then:      Site: Prostate Boost  Laterality: N/A  Total Dose: 2000cGy  Dose per Fraction: 200cGy  Total Fractions: 10  Daily or BID: Daily  Modality: Photon  Technique: IMRT/VMAT/Rapid Arc  Bolus: No    Final Delivered Dose Deviated From Initially Prescribed Dose: No    Concurrent Chemotherapy: No    Patient Tolerated Treatment Without Unexpected Side Effects/Complications: Yes    ECOG: Fully active, able to carry on all pre-disease performance without restriction = 0    Pain Management Plan: None Indicated/PRN OTC    Follow-Up Plan: 3 months    Imaging Ordered for Follow-Up: None/NA        Rodri Wallace MD

## 2024-04-17 LAB
RAD ONC ARIA COURSE END DATE: NORMAL
RAD ONC ARIA COURSE ID: NORMAL
RAD ONC ARIA COURSE INTENT: NORMAL
RAD ONC ARIA COURSE LAST TREATMENT DATE: NORMAL
RAD ONC ARIA COURSE START DATE: NORMAL
RAD ONC ARIA COURSE TREATMENT ELAPSED DAYS: 50
RAD ONC ARIA FIRST TREATMENT DATE: NORMAL
RAD ONC ARIA PLAN FRACTIONS TREATED TO DATE: 10
RAD ONC ARIA PLAN FRACTIONS TREATED TO DATE: 27
RAD ONC ARIA PLAN ID: NORMAL
RAD ONC ARIA PLAN ID: NORMAL
RAD ONC ARIA PLAN NAME: NORMAL
RAD ONC ARIA PLAN NAME: NORMAL
RAD ONC ARIA PLAN PRESCRIBED DOSE PER FRACTION: 2 GY
RAD ONC ARIA PLAN PRESCRIBED DOSE PER FRACTION: 2 GY
RAD ONC ARIA PLAN PRIMARY REFERENCE POINT: NORMAL
RAD ONC ARIA PLAN PRIMARY REFERENCE POINT: NORMAL
RAD ONC ARIA PLAN TOTAL FRACTIONS PRESCRIBED: 10
RAD ONC ARIA PLAN TOTAL FRACTIONS PRESCRIBED: 27
RAD ONC ARIA PLAN TOTAL PRESCRIBED DOSE: 2000 CGY
RAD ONC ARIA PLAN TOTAL PRESCRIBED DOSE: 5400 CGY
RAD ONC ARIA REFERENCE POINT DOSAGE GIVEN TO DATE: 20 GY
RAD ONC ARIA REFERENCE POINT DOSAGE GIVEN TO DATE: 54 GY
RAD ONC ARIA REFERENCE POINT ID: NORMAL
RAD ONC ARIA REFERENCE POINT ID: NORMAL

## 2024-05-20 ENCOUNTER — TELEPHONE (OUTPATIENT)
Dept: OTHER | Facility: HOSPITAL | Age: 74
End: 2024-05-20
Payer: OTHER GOVERNMENT

## 2024-05-20 NOTE — TELEPHONE ENCOUNTER
[facility]  SURVIVORSHIP SERVICES CARE COORDINATION NOTE  PHONE      Call placed to patient  RE:  follow up - mailed  survivorship care plan and treatment summary and resource packet    Spoke with spouse. Patient not available at this time. He did receive the resource packet. Encouraged call back at any point with questions or needs. She expressed understanding.

## 2024-07-11 ENCOUNTER — TELEPHONE (OUTPATIENT)
Dept: RADIATION ONCOLOGY | Facility: HOSPITAL | Age: 74
End: 2024-07-11
Payer: OTHER GOVERNMENT

## 2024-07-12 ENCOUNTER — OFFICE VISIT (OUTPATIENT)
Dept: RADIATION ONCOLOGY | Facility: HOSPITAL | Age: 74
End: 2024-07-12
Payer: OTHER GOVERNMENT

## 2024-07-12 VITALS
WEIGHT: 140 LBS | OXYGEN SATURATION: 98 % | DIASTOLIC BLOOD PRESSURE: 75 MMHG | HEART RATE: 62 BPM | SYSTOLIC BLOOD PRESSURE: 163 MMHG

## 2024-07-12 DIAGNOSIS — C61 PRIMARY MALIGNANT NEOPLASM OF PROSTATE: Primary | ICD-10-CM

## 2024-07-12 NOTE — PROGRESS NOTES
[facility], [place] Radiation Oncology   Follow Up    Chief Complaint  Prostate Cancer        Diagnosis: Favorable Intermediate Risk Prostate Cancer     Overall Stage IIB     [id number]      Radiation Completion Date: 4/11/2024        Prescription:      Sequential Boost     Site: Prostate and SV  Laterality: N/A  Total Dose: 5400cGy  Dose per Fraction: 200cGy  Total Fractions: 27  Daily or BID: Daily  Modality: Photon  Technique: IMRT/VMAT/Rapid Arc  Bolus: No        Then:       Site: Prostate Boost  Laterality: N/A  Total Dose: 2000cGy  Dose per Fraction: 200cGy  Total Fractions: 10  Daily or BID: Daily  Modality: Photon  Technique: IMRT/VMAT/Rapid Arc  Bolus: No      Interval History:    Duc Hernandez presents for regularly scheduled follow-up approximately 3 months after completion of radiation therapy prostate cancer.  The patient tolerated treatment well and has done fairly well in the interim.  He reports that his bowels functioning well nocturia.  He is working on some behavioral modifications to help minimize this.  He does not take Flomax.  He is also curious whether he can take erectile dysfunction medication safely.       Imaging:      No new relevant imaging      Pathology:      No new relevant pathology      Labs:    Lab Results   Component Value Date    CREATININE 1.80 (H) 01/31/2024             Problem List:  Patient Active Problem List   Diagnosis    Primary malignant neoplasm of prostate          Medications:  Current Outpatient Medications on File Prior to Visit   Medication Sig Dispense Refill    amLODIPine (NORVASC) 10 MG tablet Take 1 tablet by mouth Daily.      aspirin 325 MG tablet Take 1 tablet by mouth Daily.      atorvastatin (LIPITOR) 10 MG tablet Take 1 tablet by mouth Daily. Indications: lower cholesterol      cetirizine (zyrTEC) 10 MG tablet Take 1 tablet by mouth Daily.      chlorthalidone (HYGROTON) 25 MG tablet Take 1 tablet by mouth Daily.      ferrous sulfate 325 (65 FE) MG tablet Take 1 tablet  by mouth Daily With Breakfast.      losartan-hydrochlorothiazide (HYZAAR) 100-25 MG per tablet Take 1 tablet by mouth Daily.      metoprolol tartrate (LOPRESSOR) 50 MG tablet Take 1 tablet by mouth Daily.      multivitamin (THERAGRAN) tablet tablet Take 1 tablet by mouth Daily.       No current facility-administered medications on file prior to visit.          Allergies:  No Known Allergies        Vital Signs:  /75   Pulse 62   Wt 63.5 kg (140 lb)   SpO2 98%   There is no height or weight on file to calculate BMI.  Pain Score    07/12/24 1144   PainSc: 0-No pain         ECOG: Fully active, able to carry on all pre-disease performance without restriction = 0    Physical Exam  Vitals reviewed.   Constitutional:       General: He is not in acute distress.     Appearance: Normal appearance.   HENT:      Head: Normocephalic and atraumatic.   Eyes:      Extraocular Movements: Extraocular movements intact.      Pupils: Pupils are equal, round, and reactive to light.   Pulmonary:      Effort: Pulmonary effort is normal.   Abdominal:      General: Abdomen is flat.      Palpations: Abdomen is soft.   Musculoskeletal:      Cervical back: Normal range of motion.   Skin:     General: Skin is warm and dry.   Neurological:      General: No focal deficit present.      Mental Status: He is alert and oriented to person, place, and time.   Psychiatric:         Mood and Affect: Mood normal.         Behavior: Behavior normal.          Result Review :  The following data was reviewed by: Rodri Wallace MD on 07/12/2024:  Labs: Last Creatinine           Diagnoses and all orders for this visit:    1. Primary malignant neoplasm of prostate (Primary)        Assessment:    Duc Hernandez presents for regularly scheduled follow-up approximately 3 months after completion of radiation therapy prostate cancer.  The patient tolerated treatment well and has done fairly well in the interim.  He reports that his bowels functioning well  nocturia.  He is working on some behavioral modifications to help minimize this.  He does not take Flomax.  He is also curious whether he can take erectile dysfunction medication safely.     I met with the patient and discussed his bladder symptoms.  I discussed that I would typically recommend Flomax in the situation.  [facility] and I recommended that he discuss possible Flomax as well as possible erectile dysfunction medication.  He is going to be getting PSA at that time and I requested that he send those results to us which she was happy to do.  Otherwise, he can follow-up with me in 1 year.    Plan:    -Follow-up in 1 year       I spent 20 minutes caring for Duc on this date of service. This time includes time spent by me in the following activities:preparing for the visit, reviewing tests, obtaining and/or reviewing a separately obtained history, documenting information in the medical record, independently interpreting results and communicating that information with the patient/family/caregiver, and care coordination  Follow Up   No follow-ups on file.  Patient was given instructions and counseling regarding his condition or for health maintenance advice. Please see specific information pulled into the AVS if appropriate.     Rodri Wallace MD

## 2025-07-17 ENCOUNTER — TELEPHONE (OUTPATIENT)
Dept: RADIATION ONCOLOGY | Facility: HOSPITAL | Age: 75
End: 2025-07-17
Payer: OTHER GOVERNMENT

## 2025-07-18 ENCOUNTER — TELEPHONE (OUTPATIENT)
Dept: RADIATION ONCOLOGY | Facility: HOSPITAL | Age: 75
End: 2025-07-18
Payer: OTHER GOVERNMENT

## 2025-07-18 NOTE — TELEPHONE ENCOUNTER
Mr. Hernandez is reaching out to [facility] to get referral to schedule his follow-up appointment with Dr. Wallace. Due to not having a referral for F/U of 7/18 appointment canceled and it will be rescheduled at a later date

## (undated) DEVICE — GAUZE,SPONGE,POST-OP,4X3,STRL,LF: Brand: MEDLINE

## (undated) DEVICE — TEN SHOOTER SAEED MULTI-BAND LIGATOR: Brand: SAEED

## (undated) DEVICE — Z DISCONTINUED NO SUB IDED TUBING ETCO2 AD L6.5FT NSL ORAL CVD PRNG NONFLARED TIP OVR

## (undated) DEVICE — SYRINGE MED 50ML LUERSLIP TIP

## (undated) DEVICE — DEFENDO AIR WATER SUCTION AND BIOPSY VALVE KIT FOR  OLYMPUS: Brand: DEFENDO AIR/WATER/SUCTION AND BIOPSY VALVE

## (undated) DEVICE — NEEDLE SCLERO 25GA L240CM OD0.51MM ID0.24MM EXTN L4MM SHTH

## (undated) DEVICE — BITEBLOCK 54FR W/ DENT RIM BLOX

## (undated) DEVICE — CONNECTOR IRRIGATION AUXILIARY H2O JET W/ PRT MTL THRD HYDR